# Patient Record
Sex: FEMALE | Race: WHITE | Employment: UNEMPLOYED | URBAN - METROPOLITAN AREA
[De-identification: names, ages, dates, MRNs, and addresses within clinical notes are randomized per-mention and may not be internally consistent; named-entity substitution may affect disease eponyms.]

---

## 2018-08-21 ENCOUNTER — OFFICE VISIT (OUTPATIENT)
Dept: FAMILY MEDICINE CLINIC | Age: 38
End: 2018-08-21

## 2018-08-21 VITALS
TEMPERATURE: 98.1 F | OXYGEN SATURATION: 100 % | HEIGHT: 68 IN | DIASTOLIC BLOOD PRESSURE: 60 MMHG | WEIGHT: 181 LBS | RESPIRATION RATE: 16 BRPM | BODY MASS INDEX: 27.43 KG/M2 | HEART RATE: 60 BPM | SYSTOLIC BLOOD PRESSURE: 93 MMHG

## 2018-08-21 DIAGNOSIS — Z98.84 HISTORY OF GASTRIC BYPASS: ICD-10-CM

## 2018-08-21 DIAGNOSIS — Z86.2 HISTORY OF IRON DEFICIENCY ANEMIA: ICD-10-CM

## 2018-08-21 DIAGNOSIS — G25.81 RLS (RESTLESS LEGS SYNDROME): ICD-10-CM

## 2018-08-21 DIAGNOSIS — G62.9 PERIPHERAL POLYNEUROPATHY: Primary | ICD-10-CM

## 2018-08-21 RX ORDER — ASPIRIN 325 MG
TABLET, DELAYED RELEASE (ENTERIC COATED) ORAL
COMMUNITY

## 2018-08-21 RX ORDER — GABAPENTIN 300 MG/1
CAPSULE ORAL
Qty: 90 CAP | Refills: 0 | Status: SHIPPED | OUTPATIENT
Start: 2018-08-21 | End: 2018-09-17 | Stop reason: SDUPTHER

## 2018-08-21 RX ORDER — GABAPENTIN 600 MG/1
TABLET ORAL
Refills: 1 | COMMUNITY
Start: 2018-07-30 | End: 2018-08-21 | Stop reason: DRUGHIGH

## 2018-08-21 RX ORDER — METHOCARBAMOL 500 MG/1
TABLET, FILM COATED ORAL
Refills: 1 | COMMUNITY
Start: 2018-08-01 | End: 2018-11-05 | Stop reason: SDUPTHER

## 2018-08-21 RX ORDER — CLOBETASOL PROPIONATE 0.5 MG/G
CREAM TOPICAL
Refills: 1 | COMMUNITY
Start: 2018-07-31

## 2018-08-21 RX ORDER — PRAMIPEXOLE DIHYDROCHLORIDE 0.12 MG/1
TABLET ORAL
Refills: 0 | COMMUNITY
Start: 2018-07-31 | End: 2018-08-21 | Stop reason: SDUPTHER

## 2018-08-21 RX ORDER — TRAMADOL HYDROCHLORIDE 50 MG/1
50 TABLET ORAL
COMMUNITY
End: 2018-11-05 | Stop reason: SDUPTHER

## 2018-08-21 RX ORDER — INDOMETHACIN 50 MG/1
CAPSULE ORAL
Refills: 0 | COMMUNITY
Start: 2018-07-30 | End: 2019-01-23 | Stop reason: SDUPTHER

## 2018-08-21 RX ORDER — PRAMIPEXOLE DIHYDROCHLORIDE 0.12 MG/1
TABLET ORAL
Qty: 90 TAB | Refills: 0 | Status: SHIPPED | OUTPATIENT
Start: 2018-08-21 | End: 2018-09-17 | Stop reason: SDUPTHER

## 2018-08-21 RX ORDER — FERROUS FUMARATE 324(106)MG
TABLET ORAL
COMMUNITY

## 2018-08-21 NOTE — MR AVS SNAPSHOT
[address] 
[phone] Patient: Norris Cameron MRN: [id number] Visit Information Date & Time Provider Department Dept. Phone Encounter #  
 2018 10:00 AM Ivan Marin, Aishwarya Gonzalez [phone] [id number] Upcoming Health Maintenance Date Due DTaP/Tdap/Td series (1 - Tdap) 1999 PAP AKA CERVICAL CYTOLOGY 1999 Influenza Age 5 to Adult 2018 Allergies as of 2018  Review Complete On: 2018 By: Ivan Marin MD  
  
 Severity Noted Reaction Type Reactions Pcn [Penicillins] High 2018    Anaphylaxis Codeine  2018    Nausea and Vomiting Patient states it causes intractable vomiting Doxycycline  2018    Rash Current Immunizations  Never Reviewed No immunizations on file. Not reviewed this visit You Were Diagnosed With   
  
 Codes Comments Peripheral polyneuropathy    -  Primary ICD-10-CM: G62.9 ICD-9-CM: 356.9 History of gastric bypass     ICD-10-CM: Z98.84 ICD-9-CM: V45.86   
 RLS (restless legs syndrome)     ICD-10-CM: G25.81 ICD-9-CM: 333.94 History of iron deficiency anemia     ICD-10-CM: Z86.2 ICD-9-CM: V12.3 Vitals BP Pulse Temp Resp Height(growth percentile) Weight(growth percentile) 93/60 (BP 1 Location: Right arm, BP Patient Position: Sitting) 60 98.1 °F (36.7 °C) (Oral) 16 5' 7.75\" (1.721 m) 181 lb (82.1 kg) LMP SpO2 BMI OB Status Smoking Status 2018 (Approximate) 100% 27.72 kg/m2 Having regular periods Never Smoker Vitals History BMI and BSA Data Body Mass Index Body Surface Area  
 27.72 kg/m 2 1.98 m 2 Preferred Pharmacy Pharmacy Name Phone [facility] [place], [address] [place] AT [facility] [phone] Your Updated Medication List  
  
   
 This list is accurate as of 8/21/18 11:01 AM.  Always use your most recent med list.  
  
  
  
  
 cholecalciferol 50,000 unit capsule Commonly known as:  VITAMIN D3 Take  by mouth every seven (7) days. clobetasol 0.05 % topical cream  
Commonly known as:  TEMOVATE  
ELICEO EXT AA QD PRN  
  
 ferrous fumarate 324 mg (106 mg iron) Tab Take  by mouth.  
  
 gabapentin 300 mg capsule Commonly known as:  NEURONTIN One tab po in the am and two tabs in the evening  Indications: NEUROPATHIC PAIN  
  
 indomethacin 50 mg capsule Commonly known as:  INDOCIN  
TK 1 C PO BID WF OR MILK  
  
 methocarbamol 500 mg tablet Commonly known as:  ROBAXIN  
TAKE 1 AND 1/2 TS PO ONCE A DAY  
  
 pramipexole 0.125 mg tablet Commonly known as:  MIRAPEX TK 1 T PO  BEFORE BEDTIME QD  
  
 traMADol 50 mg tablet Commonly known as:  ULTRAM  
Take 50 mg by mouth every six (6) hours as needed for Pain. Prescriptions Sent to Pharmacy Refills  
 pramipexole (MIRAPEX) 0.125 mg tablet 0 Sig: TK 1 T PO  BEFORE BEDTIME QD Class: Normal  
 Pharmacy: [address] Ph #: [phone]  
 gabapentin (NEURONTIN) 300 mg capsule 0 Sig: One tab po in the am and two tabs in the evening  Indications: NEUROPATHIC PAIN Class: Normal  
 Pharmacy: [address] Ph #: [phone] We Performed the Following NAZ IFA W/REFLEX  CASCADE [72697 CPT(R)] CBC WITH AUTOMATED DIFF [35654 CPT(R)] FERRITIN [70438 CPT(R)] HEMOGLOBIN A1C WITH EAG [26153 CPT(R)] IRON PROFILE [id number] CPT(R)] METABOLIC PANEL, COMPREHENSIVE [64582 CPT(R)] SED RATE (ESR) [id number] CPT(R)] THYROID PANEL W/TSH [48351 CPT(R)] VITAMIN B12 & FOLATE [79820 CPT(R)] VITAMIN D, 25 HYDROXY [id number] CPT(R)] Patient Instructions Learning About Vitamin D Why is it important to get enough vitamin D? Your body needs vitamin D to absorb calcium. Calcium keeps your bones and muscles, including your heart, healthy and strong. If your muscles don't get enough calcium, they can cramp, hurt, or feel weak. You may have long-term (chronic) muscle aches and pains. If you don't get enough vitamin D throughout life, you have an increased chance of having thin and brittle bones (osteoporosis) in your later years. Children who don't get enough vitamin D may not grow as much as others their age. They also have a chance of getting a rare disease called rickets. It causes weak bones. Vitamin D and calcium are added to many foods. And your body uses sunshine to make its own vitamin D. How much vitamin D do you need? The [place] of [employer] recommends that people ages 3 through 79 get 600 IU (international units) every day. Adults 71 and older need 800 IU every day. Blood tests for vitamin D can check your vitamin D level. But there is no standard normal range used by all laboratories. The [place] of [employer] recommends a blood level of 20 ng/mL of vitamin D for healthy bones. And most people in the [place] and [facility] ([facility]) meet this goal. 
How can you get more vitamin D? Foods that contain vitamin D include: 
· [place], tuna, and mackerel. These are some of the best foods to eat when you need to get more vitamin D. 
· Cheese, egg yolks, and beef liver. These foods have vitamin D in small amounts. · Milk, soy drinks, orange juice, yogurt, margarine, and some kinds of cereal have vitamin D added to them. Some people don't make vitamin D as well as others. They may have to take extra care in getting enough vitamin D. Things that reduce how much vitamin D your body makes include: · Dark skin, such as many  Americans have. · Age, especially if you are older than 72. · Digestive problems, such as Crohn's or celiac disease. · Liver and kidney disease. Some people who do not get enough vitamin D may need supplements. Are there any risks from taking vitamin D? 
· Too much vitamin D: 
¨ Can damage your kidneys. ¨ Can cause nausea and vomiting, constipation, and weakness. ¨ Raises the amount of calcium in your blood. If this happens, you can get confused or have an irregular heart rhythm. · Vitamin D may interact with other medicines. Tell your doctor about all of the medicines you take, including over-the-counter drugs, herbs, and pills. Tell your doctor about all of your current medical problems. Where can you learn more? Go to http://nirajInteractive Fitnessdara.info/. Enter [id number] in the search box to learn more about \"Learning About Vitamin D.\" 
Current as of: May 12, 2017 Content Version: 11.7 © 5968-6267 Asantae. Care instructions adapted under license by Playrific (which disclaims liability or warranty for this information). If you have questions about a medical condition or this instruction, always ask your healthcare professional. [place] [address] any warranty or liability for your use of this information. Iron Deficiency Anemia: Care Instructions Your Care Instructions Anemia means that you do not have enough red blood cells. Red blood cells carry oxygen around your body. When you have anemia, it can make you pale, weak, and tired. Many things can cause anemia. The most common cause is loss of blood. This can happen if you have heavy menstrual periods. It can also happen if you have bleeding in your stomach or bowel. You can also get anemia if you don't have enough iron in your diet or if it's hard for your body to absorb iron. In some cases, pregnancy causes anemia. That's because a pregnant woman needs more iron. Your doctor may do more tests to find the cause of your anemia.  If a disease or other health problem is causing it, your doctor will treat that problem. It's important to follow up with your doctor to make sure that your iron level returns to normal. 
Follow-up care is a key part of your treatment and safety. Be sure to make and go to all appointments, and call your doctor if you are having problems. It's also a good idea to know your test results and keep a list of the medicines you take. How can you care for yourself at home? · If your doctor recommended iron pills, take them as directed. ¨ Try to take the pills on an empty stomach. You can do this about 1 hour before or 2 hours after meals. But you may need to take iron with food to avoid an upset stomach. ¨ Do not take antacids or drink milk or anything with caffeine within 2 hours of when you take your iron. They can keep your body from absorbing the iron well. ¨ Vitamin C helps your body absorb iron. You may want to take iron pills with a glass of orange juice or some other food high in vitamin C. 
¨ Iron pills may cause stomach problems. These include heartburn, nausea, diarrhea, constipation, and cramps. It can help to drink plenty of fluids and include fruits, vegetables, and fiber in your diet. ¨ It's normal for iron pills to make your stool a greenish or grayish black. But internal bleeding can also cause dark stool. So it's important to tell your doctor about any color changes. ¨ Call your doctor if you think you are having a problem with your iron pills. Even after you start to feel better, it will take several months for your body to build up its supply of iron. ¨ If you miss a pill, don't take a double dose. ¨ Keep iron pills out of the reach of small children. Too much iron can be very dangerous. · Eat foods with a lot of iron. These include red meat, shellfish, poultry, and eggs. They also include beans, raisins, whole-grain bread, and leafy green vegetables. · Steam your vegetables. This is the best way to prepare them if you want to get as much iron as possible. · Be safe with medicines. Do not take nonsteroidal anti-inflammatory pain relievers unless your doctor tells you to. These include aspirin, naproxen (Aleve), and ibuprofen (Advil, Motrin). · Liquid iron can stain your teeth. But you can mix it with water or juice and drink it with a straw. Then it won't get on your teeth. When should you call for help? Call 911 anytime you think you may need emergency care. For example, call if: 
  · You passed out (lost consciousness).  
 Call your doctor now or seek immediate medical care if: 
  · You are short of breath.  
  · You are dizzy or light-headed, or you feel like you may faint.  
  · You have new or worse bleeding.  
 Watch closely for changes in your health, and be sure to contact your doctor if: 
  · You feel weaker or more tired than usual.  
  · You do not get better as expected. Where can you learn more? Go to http://niraj-dara.info/. Enter Q040 in the search box to learn more about \"Iron Deficiency Anemia: Care Instructions. \" Current as of: October 9, 2017 Content Version: 11.7 © 5135-3220 Tapit. Care instructions adapted under license by ELERTS (which disclaims liability or warranty for this information). If you have questions about a medical condition or this instruction, always ask your healthcare professional. [address] any warranty or liability for your use of this information. Iron-Rich Diet: Care Instructions Your Care Instructions Your body needs iron to make hemoglobin. Hemoglobin is a substance in red blood cells that carries oxygen from the lungs to cells all through your body. If you do not get enough iron, your body makes fewer and smaller red blood cells. As a result, your body's cells may not get enough oxygen.  
Adult men need 8 milligrams of iron a day; adult women need 18 milligrams of iron a day. After menopause, women need 8 milligrams of iron a day. A pregnant woman needs 27 milligrams of iron a day. Infants and young children have higher iron needs relative to their size than other age groups. People who have lost blood because of ulcers or heavy menstrual periods may become very low in iron and may develop anemia. Most people can get the iron their bodies need by eating enough of certain iron-rich foods. Your doctor may recommend that you take an iron supplement along with eating an iron-rich diet. Follow-up care is a key part of your treatment and safety. Be sure to make and go to all appointments, and call your doctor if you are having problems. It's also a good idea to know your test results and keep a list of the medicines you take. How can you care for yourself at home? · Make iron-rich foods a part of your daily diet. Iron-rich foods include: ¨ All meats, such as chicken, beef, lamb, pork, fish, and shellfish. Liver is especially high in iron. ¨ Leafy green vegetables. ¨ Raisins, peas, beans, lentils, barley, and eggs. ¨ Iron-fortified breakfast cereals. · Eat foods with vitamin C along with iron-rich foods. Vitamin C helps you absorb more iron from food. Drink a glass of orange juice or another citrus juice with your food. · Eat meat and vegetables or grains together. The iron in meat helps your body absorb the iron in other foods. Where can you learn more? Go to http://niraj-dara.info/. Enter [id number] in the search box to learn more about \"Iron-Rich Diet: Care Instructions. \" Current as of: May 12, 2017 Content Version: 11.7 © 1402-5172 Vivaty. Care instructions adapted under license by "Frost[employer] Video[employer]." (which disclaims liability or warranty for this information).  If you have questions about a medical condition or this instruction, always ask your healthcare professional. [employer] disclaims any warranty or liability for your use of this information. Restless Legs Syndrome: Care Instructions Your Care Instructions Restless legs syndrome is a common nervous system problem. People with this syndrome feel a creeping, achy, or unpleasant feeling in the legs and an overpowering urge to move them. It often occurs in the evening and at night and can lead to sleep problems and tiredness. Your doctor may suggest doing a study of your sleep patterns to figure out what is happening when you try to sleep. Many people get relief from symptoms when they get regular exercise, eat well, and avoid caffeine, alcohol, and tobacco. 
Follow-up care is a key part of your treatment and safety. Be sure to make and go to all appointments, and call your doctor if you are having problems. It's also a good idea to know your test results and keep a list of the medicines you take. How can you care for yourself at home? · Take your medicines exactly as prescribed. Call your doctor if you think you are having a problem with your medicine. · Try bathing in hot or cold water. Applying a heating pad or ice bag to your legs may also help symptoms. · Stretch and massage your legs before bed or when discomfort begins. · Get some exercise for at least 30 minutes a day on most days of the week. Stop exercising at least 3 hours before bedtime. · Try to plan for situations where you will need to remain seated for long stretches. For example, if you are traveling by car, plan some stops so you can get out and walk around. · Tell your doctor about any medicines you are taking. This includes all over-the-counter, prescription, and herbal medicines. Some medicines, such as antidepressants, antihistamines, and cold and sinus medicines, can make your symptoms worse. · Avoid caffeine products, such as coffee, tea, cola, and chocolate. Caffeine can interrupt your sleep and stimulate you. · Do not smoke. Nicotine can make restless legs worse. If you need help quitting, talk to your doctor about stop-smoking programs and medicines. These can increase your chances of quitting for good. · Do not drink alcohol late in the evening. Take steps to help you sleep better · Get plenty of sunlight in the outdoors, particularly later in the afternoon. · Use the evening hours for settling down. Avoid activities that challenge you in the hours before bedtime. · Eat meals at regular times, and do not snack before bedtime. · Keep your bedroom quiet, dark, and cool. Try using a sleep mask and earplugs to help you sleep. · Limit how much you drink at night to reduce your need to get up to urinate. But do not go to bed thirsty. · Run a fan or other steady \"white noise\" during the night if noises wake you up. · [place] the bed for sleeping and sex. Do your reading or TV watching in another room. · Once you are in bed, relax from head to toe, and guide your mind to pleasant thoughts. · Do not stay in bed longer than 8 hours, and try to avoid naps. · If your symptoms usually improve around 4 a.m. to 6 a.m., try going to bed later than usual or allowing extra time for sleeping in to help you get the rest you need. When should you call for help? Watch closely for changes in your health, and be sure to contact your doctor if: 
  · You are still not getting enough sleep.  
  · Your symptoms become more severe or happen more often. Where can you learn more? Go to http://niraj-dara.info/. Enter E580 in the search box to learn more about \"Restless Legs Syndrome: Care Instructions. \" Current as of: October 9, 2017 Content Version: 11.7 © 7983-7506 KXEN. Care instructions adapted under license by Signal Vine (which disclaims liability or warranty for this information).  If you have questions about a medical condition or this instruction, always ask your healthcare professional. [place] [address] any warranty or liability for your use of this information. Neuropathic Pain: Care Instructions Your Care Instructions Neuropathic pain is caused by pressure on or damage to your nerves. It's often simply called nerve pain. Some people feel this type of pain all the time. For others, it comes and goes. Diabetes, shingles, or an injury can cause nerve pain. Many people say the pain feels sharp, burning, or stabbing. But some people feel it as a dull ache. In some cases, it makes your skin very sensitive. So touch, pressure, and other sensations that did not hurt before may now cause pain. It's important to know that this kind of pain is real and can affect your quality of life. It's also important to know that treatment can help. Treatment includes pain medicines, exercise, and physical therapy. Medicines can help reduce the number of pain signals that travel over the nerves. This can make the painful areas less sensitive. It can also help you sleep better and improve your mood. But medicines are only one part of successful treatment. Most people do best with more than one kind of treatment. Your doctor may recommend that you try cognitive-behavioral therapy and stress management. Or, if needed, you may decide to try to quit smoking, lower your blood pressure, or better control blood sugar. These kinds of healthy changes can also make a difference. If you feel that your treatment is not working, talk to your doctor. And be sure to tell your doctor if you think you might be depressed or anxious. These are common problems that can also be treated. Follow-up care is a key part of your treatment and safety. Be sure to make and go to all appointments, and call your doctor if you are having problems. It's also a good idea to know your test results and keep a list of the medicines you take. How can you care for yourself at home? · Be safe with medicines. Read and follow all instructions on the label. ¨ If the doctor gave you a prescription medicine for pain, take it as prescribed. ¨ If you are not taking a prescription pain medicine, ask your doctor if you can take an over-the-counter medicine. · Save hard tasks for days when you have less pain. Follow a hard task with an easy task. And remember to take breaks. · Relax, and reduce stress. You may want to try deep breathing or meditation. These can help. · Keep moving. Gentle, daily exercise can help reduce pain. Your doctor or physical therapist can tell you what type of exercise is best for you. This may include walking, swimming, and stationary biking. It may also include stretches and range-of-motion exercises. · Try heat, cold packs, and massage. · Get enough sleep. Constant pain can make you more tired. If the pain makes it hard to sleep, talk with your doctor. · Think positively. Your thoughts can affect your pain. Do fun things to distract yourself from the pain. See a movie, read a book, listen to music, or spend time with a friend. · Keep a pain diary. Try to write down how strong your pain is and what it feels like. Also try to notice and write down how your moods, thoughts, sleep, activities, and medicine affect your pain. These notes can help you and your doctor find the best ways to treat your pain. Reducing constipation caused by pain medicine Pain medicines often cause constipation. To reduce constipation: 
· Include fruits, vegetables, beans, and whole grains in your diet each day. These foods are high in fiber. · Drink plenty of fluids, enough so that your urine is light yellow or clear like water. If you have kidney, heart, or liver disease and have to limit fluids, talk with your doctor before you increase the amount of fluids you drink. · Get some exercise every day.  Build up slowly to 30 to 60 minutes a day on 5 or more days of the week. · Take a fiber supplement, such as Citrucel or Metamucil, every day if needed. Read and follow all instructions on the label. · Schedule time each day for a bowel movement. Having a daily routine may help. Take your time and do not strain when having a bowel movement. · Ask your doctor about a laxative. The goal is to have one easy bowel movement every 1 to 2 days. Do not let constipation go untreated for more than 3 days. When should you call for help? Call your doctor now or seek immediate medical care if: 
  · You feel sad, anxious, or hopeless for more than a few days. This could mean you are depressed. Depression is common in people who have a lot of pain. But it can be treated.  
  · You have trouble with bowel movements, such as: 
¨ No bowel movement in 3 days. ¨ Blood in the anal area, in your stool, or on the toilet paper. ¨ Diarrhea for more than 24 hours.  
 Watch closely for changes in your health, and be sure to contact your doctor if: 
  · Your pain is getting worse.  
  · You can't sleep because of pain.  
  · You are very worried or anxious about your pain.  
  · You have trouble taking your pain medicine.  
  · You have any concerns about your pain medicine or its side effects.  
  · You have vomiting or cramps for more than 2 hours. Where can you learn more? Go to http://niraj-dara.info/. Enter Z129 in the search box to learn more about \"Neuropathic Pain: Care Instructions. \" Current as of: October 9, 2017 Content Version: 11.7 © 2275-2172 [employer] Technologies. Care instructions adapted under license by Xi3 (which disclaims liability or warranty for this information). If you have questions about a medical condition or this instruction, always ask your healthcare professional. [place] [address] any warranty or liability for your use of this information. Learning About Peripheral Neuropathy What is peripheral neuropathy? Peripheral neuropathy is a problem that affects the peripheral nerves. These nerves lead from the spinal cord to other parts of the body. They control your sense of touch, how you feel pain and temperature, and your muscle strength. Most of the time the problem starts in the fingers and toes. As it gets worse, it moves into the limbs. It can cause pain and loss of feeling in the feet, legs, and hands. What causes it? There are several causes of peripheral neuropathy: 
· Diabetes. This is the most common cause. If your blood sugar is too high for too long, it can damage the nerves. · Kidney problems. These can lead to toxic substances in the blood that damage nerves. · Overusing alcohol and not eating a healthy diet. These can lead to your body not having enough of certain vitamins, such as vitamin B-12. This can damage nerves. · Infectious or inflammatory diseases. These include HIV and Guillain-Barré syndrome. These diseases can damage the nerves. · Being exposed to toxic substances. These include certain medicines, such as those used for chemotherapy. · Low levels of thyroid hormone (hypothyroidism). Sometimes the cause is not known. What are the symptoms? Symptoms of peripheral neuropathy can occur slowly over time. The most common ones are: · Numbness, tightness, and tingling, especially in the legs, hands, and feet. · Loss of feeling. · Burning, shooting, or stabbing pain in the legs, hands, and feet. Often the pain is worse at night. · Weakness and loss of balance. What can happen if you have it? If peripheral neuropathy gets worse, it can lead to a complete lack of feeling in your hands or feet. This can make you more likely to injure them. It may lead to calluses and blisters. It can also lead to bone and joint problems, infection, and ulcers.  
For instance, small, repeated injuries to the foot may lead to bigger problems. This can happen because you can't feel the injuries. Reduced feeling in the feet can also change your step, leading to bone or joint problems. If untreated, foot problems can become so severe that the foot or lower leg may have to be amputated. But treatment can slow down peripheral neuropathy. And it's a good idea to take care to avoid injury. How is it diagnosed? To diagnose peripheral neuropathy, your doctor will ask you about: 
· Your symptoms. · Your medical history. This may include your use of alcohol, risk of HIV infection, or exposure to toxic substances. · Your family's medical history, including nerve disease. Your doctor may test how well you can feel touch, temperature, and pain. You may also have blood tests. These tests will help the doctor find out if you have conditions that can cause neuropathy. Examples are diabetes, vitamin deficiencies, thyroid disease, and kidney problems. How is it treated? Treatment for peripheral neuropathy can relieve symptoms. This is done by treating the health problem that's causing it. For example, if you have diabetes, keeping your blood sugar within your target range may help. Or maybe your body lacks certain vitamins caused by drinking too much alcohol. In that case, treatment may include eating a healthy diet, taking vitamins, and stopping alcohol use. You may have physical therapy. This can increase muscle strength and help build muscle control. Over-the-counter medicine can relieve mild nerve pain. Your doctor may also prescribe medicine to help with severe pain, numbness, tingling, and weakness. If you have neuropathy in your feet, it's a good idea to have them checked during each office visit. This can help prevent problems. Some people find that physical therapy, acupuncture, or transcutaneous electrical nerve stimulation (TENS) helps relieve pain. Follow-up care is a key part of your treatment and safety.  Be sure to make and go to all appointments, and call your doctor if you are having problems. It's also a good idea to know your test results and keep a list of the medicines you take. Where can you learn more? Go to http://niraj-dara.info/. Enter P130 in the search box to learn more about \"Learning About Peripheral Neuropathy. \" Current as of: November 28, 2017 Content Version: 11.7 © 1712-9532 [employer]. Care instructions adapted under license by [employer] (which disclaims liability or warranty for this information). If you have questions about a medical condition or this instruction, always ask your healthcare professional. [address] any warranty or liability for your use of this information. Introducing hospitals & HEALTH SERVICES! Benton Morton introduces [employer] patient portal. Now you can access parts of your medical record, email your doctor's office, and request medication refills online. 1. In your internet browser, go to [email or web address] 2. Click on the First Time User? Click Here link in the Sign In box. You will see the New Member Sign Up page. 3. Enter your [employer] Access Code exactly as it appears below. You will not need to use this code after youve completed the sign-up process. If you do not sign up before the expiration date, you must request a new code. · [employer] Access Code: [email or web address] Expires: 11/19/2018 11:01 AM 
 
4. Enter the last four digits of your Social Security Number (xxxx) and Date of Birth (mm/dd/yyyy) as indicated and click Submit. You will be taken to the next sign-up page. 5. Create a Tinker Games ID. This will be your [employer] Games login ID and cannot be changed, so think of one that is secure and easy to remember. 6. Create a [employer] password. You can change your password at any time. 7. Enter your Password Reset Question and Answer.  This can be used at a later time if you forget your password. 8. Enter your e-mail address. You will receive e-mail notification when new information is available in [address] 9. Click Sign Up. You can now view and download portions of your medical record. 10. Click the Download Summary menu link to download a portable copy of your medical information. If you have questions, please visit the Frequently Asked Questions section of the SweetSpot WiFi website. Remember, [employer] is NOT to be used for urgent needs. For medical emergencies, dial 911. Now available from your iPhone and Android! Please provide this summary of care documentation to your next provider. If you have any questions after today's visit, please call [phone].

## 2018-08-21 NOTE — PROGRESS NOTES
Identified Patient with two Patient identifiers (Name and ). Two Patient Identifiers confirmed. Reviewed record in preparation for visit and have obtained necessary documentation. Chief Complaint   Patient presents with   [address]     would like to talk about anemia and increase her dose of gabapentin       Visit Vitals    BP 93/60 (BP 1 Location: Right arm, BP Patient Position: Sitting)    Pulse 60    Temp 98.1 °F (36.7 °C) (Oral)    Resp 16    Ht 5' 7.75\" (1.721 m)    Wt 181 lb (82.1 kg)    SpO2 100%    BMI 27.72 kg/m2       1. Have you been to the ER, urgent care clinic since your last visit? Hospitalized since your last visit? NEW PATIENT    2. Have you seen or consulted any other health care providers outside of the [address] [place] [address] since your last visit? Include any pap smears or colon screening.  NEW PATIENT

## 2018-08-21 NOTE — PROGRESS NOTES
Elaine Loja  34 y.o. female  1980  [facility], [place] [id number]  <[id number]>   [address]: Progress Note  Flakito Fulton MD       Encounter Date: 8/21/2018    Chief Complaint   Patient presents with   [address]     would like to talk about anemia and increase her dose of gabapentin     History of Present Illness   Elaine Loja is a 34 y.o. female who presents to clinic today for establishment of care. 1. Peripheral neuropathy. She states she has been to countless neurologists and rheumatologists in [place], [place], [place]. Most recently in [place]. Wants to increase the gabapentin from 600mg. She had been on as high as 900 mg. Takes only at night due to not wanting to.   2. Hx of RLS on mirapex. 3. Hx of iron deficiency anemia. Still has menses however, they are not heavy per her report.   -hx of gastric bypass with merline n y.   occ compliant with MTVs.     Recently relocated with a job for her . Review of Systems   Review of Systems   Constitutional: Negative for chills and fever. HENT: Negative for congestion and ear pain. Eyes: Positive for blurred vision. Respiratory: Negative. Negative for cough. Cardiovascular: Negative. Negative for chest pain. Gastrointestinal: Negative for abdominal pain, constipation, diarrhea, nausea and vomiting. Genitourinary: Negative. Musculoskeletal: Positive for myalgias. Neurological: Positive for tingling. Negative for focal weakness. Vitals/Objective:     Vitals:    08/21/18 1002   BP: 93/60   Pulse: 60   Resp: 16   Temp: 98.1 °F (36.7 °C)   TempSrc: Oral   SpO2: 100%   Weight: 181 lb (82.1 kg)   Height: 5' 7.75\" (1.721 m)     Body mass index is 27.72 kg/(m^2). Physical Exam   Constitutional: She appears well-developed and well-nourished. No distress. HENT:   Head: Normocephalic and atraumatic.    Right Ear: Tympanic membrane normal.   Left Ear: Tympanic membrane normal.   Mouth/Throat: Oropharynx is clear and moist.   Eyes: Pupils are equal, round, and reactive to light. Cardiovascular: Normal rate, regular rhythm, normal heart sounds and intact distal pulses. Exam reveals no gallop and no friction rub. No murmur heard. Pulmonary/Chest: Effort normal and breath sounds normal. No respiratory distress. She has no wheezes. She has no rales. Abdominal: Soft. Bowel sounds are normal. She exhibits no distension. There is no tenderness. There is no rebound and no guarding. Musculoskeletal: Normal range of motion. She exhibits no edema or tenderness. Neurological: She has normal reflexes. She displays normal reflexes. No cranial nerve deficit. Skin: Skin is warm. She is not diaphoretic. No results found for this or any previous visit (from the past 24 hour(s)). Assessment and Plan:   1. Peripheral polyneuropathy  Labs ordered for chr disease surveillance. Awaiting records from [facility]. Will increase gabapentin. Advised pt to take TID if possible. Advised not to take 900 mg at one time. - CBC WITH AUTOMATED DIFF  - METABOLIC PANEL, COMPREHENSIVE  - VITAMIN D, 25 HYDROXY  - HEMOGLOBIN A1C WITH EAG  - VITAMIN B12 & FOLATE  - THYROID PANEL W/TSH  - NAZ IFA W/REFLEX  CASCADE  - SED RATE (ESR)  - gabapentin (NEURONTIN) 300 mg capsule; One tab po in the am and two tabs in the evening  Indications: NEUROPATHIC PAIN  Dispense: 90 Cap; Refill: 0    2. History of gastric bypass  Check for vitamin deficiencies and malabsorption along with screening for RLS  - CBC WITH AUTOMATED DIFF  - IRON PROFILE  - VITAMIN D, 25 HYDROXY    3. RLS (restless legs syndrome)  RF given. - IRON PROFILE  - FERRITIN  - VITAMIN D, 25 HYDROXY  - VITAMIN B12 & FOLATE  - pramipexole (MIRAPEX) 0.125 mg tablet; TK 1 T PO  BEFORE BEDTIME QD  Dispense: 90 Tab; Refill: 0    4.  History of iron deficiency anemia  - CBC WITH AUTOMATED DIFF  - IRON PROFILE  - FERRITIN    I have discussed the diagnosis with the patient and the intended plan as seen in the above orders. she has expressed understanding. The patient has received an after-visit summary and questions were answered concerning future plans. I have discussed medication side effects and warnings with the patient as well. Follow-up Disposition: Not on File    Electronically Signed: Eliot Ortez MD     History   Patients past medical, surgical and family histories were reviewed and updated. Past Medical History:   Diagnosis Date    Anemia     Cervical incompetence     s/p cerclage with last pregnancy    Chiari malformation type I ([facility] [address] 2012    Fibromyalgia     History of  delivery     due to cervical incompetency-36 wks, 32 wks, and 37 wks deliveries    Lupus 2003    Lupus     Osteoarthritis     Other torticollis     Peripheral neuropathy     Raynaud phenomenon      Past Surgical History:   Procedure Laterality Date    HX GASTRIC BYPASS  2007    HX TONSILLECTOMY  2004     Family History   Problem Relation Age of Onset    Cancer Mother     Coronary Artery Disease Father     Parkinson's Disease Maternal Grandmother      Social History     Social History    Marital status: UNKNOWN     Spouse name: N/A    Number of children: N/A    Years of education: N/A     Occupational History    Not on file.      Social History Main Topics    Smoking status: Never Smoker    Smokeless tobacco: Never Used    Alcohol use No    Drug use: No    Sexual activity: Yes     Partners: Male     Birth control/ protection: None     Other Topics Concern    Not on file     Social History Narrative    No narrative on file            Current Medications/Allergies     Current Outpatient Prescriptions   Medication Sig Dispense Refill    indomethacin (INDOCIN) 50 mg capsule TK 1 C PO BID WF OR MILK  0    methocarbamol (ROBAXIN) 500 mg tablet TAKE 1 AND 1/2 TS PO ONCE A DAY  1    clobetasol (TEMOVATE) 0.05 % topical cream ELICEO EXT AA QD PRN  1    traMADol (ULTRAM) 50 mg tablet Take 50 mg by mouth every six (6) hours as needed for Pain.  cholecalciferol (VITAMIN D3) 50,000 unit capsule Take  by mouth every seven (7) days.  ferrous fumarate 324 mg (106 mg iron) tab Take  by mouth.       pramipexole (MIRAPEX) 0.125 mg tablet TK 1 T PO  BEFORE BEDTIME QD 90 Tab 0    gabapentin (NEURONTIN) 300 mg capsule One tab po in the am and two tabs in the evening  Indications: NEUROPATHIC PAIN 90 Cap 0     Allergies   Allergen Reactions    Pcn [Penicillins] Anaphylaxis    Codeine Nausea and Vomiting     Patient states it causes intractable vomiting     Doxycycline Rash

## 2018-08-23 LAB
25(OH)D3+25(OH)D2 SERPL-MCNC: 32.7 NG/ML (ref 30–100)
ALBUMIN SERPL-MCNC: 4.1 G/DL (ref 3.5–5.5)
ALBUMIN/GLOB SERPL: 1.6 {RATIO} (ref 1.2–2.2)
ALP SERPL-CCNC: 67 IU/L (ref 39–117)
ALT SERPL-CCNC: 12 IU/L (ref 0–32)
AST SERPL-CCNC: 15 IU/L (ref 0–40)
BASOPHILS # BLD AUTO: 0 X10E3/UL (ref 0–0.2)
BASOPHILS NFR BLD AUTO: 1 %
BILIRUB SERPL-MCNC: 0.6 MG/DL (ref 0–1.2)
BUN SERPL-MCNC: 11 MG/DL (ref 6–24)
BUN/CREAT SERPL: 18 (ref 9–23)
CALCIUM SERPL-MCNC: 8.6 MG/DL (ref 8.7–10.2)
CHLORIDE SERPL-SCNC: 108 MMOL/L (ref 96–106)
CO2 SERPL-SCNC: 22 MMOL/L (ref 20–29)
CREAT SERPL-MCNC: 0.62 MG/DL (ref 0.57–1)
EOSINOPHIL # BLD AUTO: 0.2 X10E3/UL (ref 0–0.4)
EOSINOPHIL NFR BLD AUTO: 4 %
ERYTHROCYTE [DISTWIDTH] IN BLOOD BY AUTOMATED COUNT: 17 % (ref 12.3–15.4)
ERYTHROCYTE [SEDIMENTATION RATE] IN BLOOD BY WESTERGREN METHOD: 3 MM/HR (ref 0–32)
EST. AVERAGE GLUCOSE BLD GHB EST-MCNC: 111 MG/DL
FERRITIN SERPL-MCNC: 5 NG/ML (ref 15–150)
FOLATE SERPL-MCNC: 7.4 NG/ML
FT4I SERPL CALC-MCNC: 1.4 (ref 1.2–4.9)
GLOBULIN SER CALC-MCNC: 2.5 G/DL (ref 1.5–4.5)
GLUCOSE SERPL-MCNC: 87 MG/DL (ref 65–99)
HBA1C MFR BLD: 5.5 % (ref 4.8–5.6)
HCT VFR BLD AUTO: 29.2 % (ref 34–46.6)
HGB BLD-MCNC: 8.8 G/DL (ref 11.1–15.9)
IMM GRANULOCYTES # BLD: 0 X10E3/UL (ref 0–0.1)
IMM GRANULOCYTES NFR BLD: 0 %
IRON SATN MFR SERPL: 3 % (ref 15–55)
IRON SERPL-MCNC: 15 UG/DL (ref 27–159)
LYMPHOCYTES # BLD AUTO: 1.6 X10E3/UL (ref 0.7–3.1)
LYMPHOCYTES NFR BLD AUTO: 39 %
MCH RBC QN AUTO: 23.2 PG (ref 26.6–33)
MCHC RBC AUTO-ENTMCNC: 30.1 G/DL (ref 31.5–35.7)
MCV RBC AUTO: 77 FL (ref 79–97)
MONOCYTES # BLD AUTO: 0.3 X10E3/UL (ref 0.1–0.9)
MONOCYTES NFR BLD AUTO: 8 %
NEUTROPHILS # BLD AUTO: 2 X10E3/UL (ref 1.4–7)
NEUTROPHILS NFR BLD AUTO: 48 %
PLATELET # BLD AUTO: 277 X10E3/UL (ref 150–379)
POTASSIUM SERPL-SCNC: 4.6 MMOL/L (ref 3.5–5.2)
PROT SERPL-MCNC: 6.6 G/DL (ref 6–8.5)
RBC # BLD AUTO: 3.8 X10E6/UL (ref 3.77–5.28)
SODIUM SERPL-SCNC: 143 MMOL/L (ref 134–144)
T3RU NFR SERPL: 24 % (ref 24–39)
T4 SERPL-MCNC: 6 UG/DL (ref 4.5–12)
TIBC SERPL-MCNC: 432 UG/DL (ref 250–450)
TSH SERPL DL<=0.005 MIU/L-ACNC: 1.89 UIU/ML (ref 0.45–4.5)
UIBC SERPL-MCNC: 417 UG/DL (ref 131–425)
VIT B12 SERPL-MCNC: 668 PG/ML (ref 232–1245)
WBC # BLD AUTO: 4.1 X10E3/UL (ref 3.4–10.8)

## 2018-08-23 NOTE — PROGRESS NOTES
Letter: all of your labs are normal except your iron levels. They are extremely low. Please take iron 325 mg two times daily to improve these levels. This is likely contributing to your restless leg syndrome. Continue all of your current medication and keep your upcoming appointment.

## 2018-09-17 ENCOUNTER — OFFICE VISIT (OUTPATIENT)
Dept: FAMILY MEDICINE CLINIC | Age: 38
End: 2018-09-17

## 2018-09-17 ENCOUNTER — HOSPITAL ENCOUNTER (OUTPATIENT)
Dept: LAB | Age: 38
Discharge: HOME | End: 2018-09-17
Payer: COMMERCIAL

## 2018-09-17 VITALS
DIASTOLIC BLOOD PRESSURE: 60 MMHG | TEMPERATURE: 98.3 F | HEIGHT: 68 IN | WEIGHT: 175 LBS | BODY MASS INDEX: 26.52 KG/M2 | OXYGEN SATURATION: 100 % | SYSTOLIC BLOOD PRESSURE: 97 MMHG | RESPIRATION RATE: 16 BRPM | HEART RATE: 60 BPM

## 2018-09-17 DIAGNOSIS — Z83.71 FAMILY HISTORY OF COLONIC POLYPS: ICD-10-CM

## 2018-09-17 DIAGNOSIS — G62.9 PERIPHERAL POLYNEUROPATHY: ICD-10-CM

## 2018-09-17 DIAGNOSIS — Z01.419 WELL WOMAN EXAM WITH ROUTINE GYNECOLOGICAL EXAM: Primary | ICD-10-CM

## 2018-09-17 DIAGNOSIS — Z12.39 BREAST CANCER SCREENING: ICD-10-CM

## 2018-09-17 DIAGNOSIS — G25.81 RLS (RESTLESS LEGS SYNDROME): ICD-10-CM

## 2018-09-17 DIAGNOSIS — D50.9 MICROCYTIC ANEMIA: ICD-10-CM

## 2018-09-17 DIAGNOSIS — G43.709 CHRONIC MIGRAINE WITHOUT AURA WITHOUT STATUS MIGRAINOSUS, NOT INTRACTABLE: ICD-10-CM

## 2018-09-17 PROCEDURE — 88175 CYTOPATH C/V AUTO FLUID REDO: CPT | Performed by: FAMILY MEDICINE

## 2018-09-17 PROCEDURE — 87624 HPV HI-RISK TYP POOLED RSLT: CPT | Performed by: FAMILY MEDICINE

## 2018-09-17 RX ORDER — PRAMIPEXOLE DIHYDROCHLORIDE 0.12 MG/1
TABLET ORAL
Qty: 90 TAB | Refills: 0 | Status: SHIPPED | OUTPATIENT
Start: 2018-09-17 | End: 2019-01-03 | Stop reason: SDUPTHER

## 2018-09-17 NOTE — PROGRESS NOTES
Chief Complaint Patient presents with  Complete Physical  
 
1. Have you been to the ER, urgent care clinic since your last visit? Hospitalized since your last visit? No 
 
2. Have you seen or consulted any other health care providers outside of the [facility] since your last visit? Include any pap smears or colon screening.  No

## 2018-09-17 NOTE — MR AVS SNAPSHOT
[address] 
[phone] Patient: Hernán Russell MRN: [id number] EHL:[id number] Visit Information Date & Time Provider Department Dept. Phone Encounter #  
 9/17/2018  3:00 PM Gisel Bartlett, [address] [facility] [phone] [id number] Follow-up Instructions Return in about 3 months (around 12/17/2018) for Anemia follow up. Upcoming Health Maintenance Date Due DTaP/Tdap/Td series (1 - Tdap) 1/20/1999 PAP AKA CERVICAL CYTOLOGY 1/20/1999 Influenza Age 5 to Adult 8/1/2018 Allergies as of 9/17/2018  Review Complete On: 9/17/2018 By: Judith Patch Severity Noted Reaction Type Reactions Pcn [Penicillins] High 08/21/2018    Anaphylaxis Codeine  08/21/2018    Nausea and Vomiting Patient states it causes intractable vomiting Doxycycline  08/21/2018    Rash Current Immunizations  Never Reviewed No immunizations on file. Not reviewed this visit You Were Diagnosed With   
  
 Codes Comments Well woman exam with routine gynecological exam    -  Primary ICD-10-CM: X42.808 ICD-9-CM: V72.31   
 RLS (restless legs syndrome)     ICD-10-CM: G25.81 ICD-9-CM: 333.94 Breast cancer screening     ICD-10-CM: Z12.31 
ICD-9-CM: V76.10 Microcytic anemia     ICD-10-CM: D50.9 ICD-9-CM: 280.9 Family history of colonic polyps     ICD-10-CM: Z83.71 ICD-9-CM: V18.51 Chronic migraine without aura without status migrainosus, not intractable     ICD-10-CM: U65.904 ICD-9-CM: 346.70 Vitals BP Pulse Temp Resp Height(growth percentile) Weight(growth percentile) 97/60 (BP 1 Location: Right arm, BP Patient Position: Sitting) 100 98.3 °F (36.8 °C) (Oral) 16 5' 7.75\" (1.721 m) 175 lb (79.4 kg) LMP SpO2 BMI OB Status Smoking Status 09/12/2018 (Approximate) (!) 60% 26.81 kg/m2 Having regular periods Never Smoker Vitals History BMI and BSA Data Body Mass Index Body Surface Area  
 26.81 kg/m 2 1.95 m 2 Preferred Pharmacy Pharmacy Name Phone [facility] [address] AT [facility] [phone] Your Updated Medication List  
  
   
This list is accurate as of 9/17/18  4:29 PM.  Always use your most recent med list.  
  
  
  
  
 cholecalciferol 50,000 unit capsule Commonly known as:  VITAMIN D3 Take  by mouth every seven (7) days. clobetasol 0.05 % topical cream  
Commonly known as:  TEMOVATE  
ELICEO EXT AA QD PRN  
  
 ferrous fumarate 324 mg (106 mg iron) Tab Take  by mouth.  
  
 gabapentin 300 mg capsule Commonly known as:  NEURONTIN One tab po in the am and two tabs in the evening  Indications: NEUROPATHIC PAIN  
  
 indomethacin 50 mg capsule Commonly known as:  INDOCIN  
TK 1 C PO BID WF OR MILK  
  
 methocarbamol 500 mg tablet Commonly known as:  ROBAXIN  
TAKE 1 AND 1/2 TS PO ONCE A DAY  
  
 pramipexole 0.125 mg tablet Commonly known as:  MIRAPEX TK 1 T PO  BEFORE BEDTIME QD  
  
 traMADol 50 mg tablet Commonly known as:  ULTRAM  
Take 50 mg by mouth every six (6) hours as needed for Pain. Prescriptions Sent to Pharmacy Refills  
 pramipexole (MIRAPEX) 0.125 mg tablet 0 Sig: TK 1 T PO  BEFORE BEDTIME QD Class: Normal  
 Pharmacy: [facility] [address] Ph #: [phone] We Performed the Following PAP IG, APTIMA HPV AND RFX 16/18,45 ([id number]) [[id number] Custom] REFERRAL TO GASTROENTEROLOGY [QCP93 Custom] REFERRAL TO HEMATOLOGY [AGP91 Custom] REFERRAL TO HEMATOLOGY [EBF39 Custom] Comments:  
 Microcytic anemia. Hx of gastric bypass. REFERRAL TO NEUROLOGY [QBD85 Custom] Comments:  
 Migraines and chiari malformation Follow-up Instructions Return in about 3 months (around 12/17/2018) for Anemia follow up. To-Do List   
 09/17/2018 Imaging:  ANT MAMMO BI SCREENING INCL CAD Referral Information Referral ID Referred By Referred To  
  
 [id number] Conner HOWE Not Available Visits Status Start Date End Date 1 New Request 9/17/18 9/17/19 If your referral has a status of pending review or denied, additional information will be sent to support the outcome of this decision. Referral ID Referred By Referred To  
 [id number] [facility] [address] [address] Phone: [phone] Fax: [phone] Visits Status Start Date End Date 1 New Request 9/17/18 9/17/19 If your referral has a status of pending review or denied, additional information will be sent to support the outcome of this decision. Referral ID Referred By Referred To  
 [id number] Eber PRIETO MD  
   [address] [facility] [address] [place], [address] [facility] Phone: [phone] Fax: [phone] Visits Status Start Date End Date 1 New Request 9/17/18 9/17/19 If your referral has a status of pending review or denied, additional information will be sent to support the outcome of this decision. Referral ID Referred By Referred To  
 [id number] Israel PRIETO MD  
   [address] [facility] [address] [place] [id number] [facility] Phone: [phone] Fax: [phone] Visits Status Start Date End Date 1 New Request 9/17/18 9/17/19 If your referral has a status of pending review or denied, additional information will be sent to support the outcome of this decision. Patient Instructions Colon Cancer Screening: Care Instructions Your Care Instructions Colorectal cancer occurs in the colon or rectum.  That's the lower part of your digestive system. It is the second-leading cause of cancer deaths in the [place]. It often starts with small growths called polyps in the colon or rectum. Polyps are usually found with screening tests. Depending on the type of test, any polyps found may be removed during the tests. Colorectal cancer usually does not cause symptoms at first. But regular tests can help find it early, before it spreads and becomes harder to treat. Experts advise routine tests for colon cancer for people starting at age 48. And they advise people with a higher risk of colon cancer to get tested sooner. Talk with your doctor about when you should start testing. Discuss which tests you need. Follow-up care is a key part of your treatment and safety. Be sure to make and go to all appointments, and call your doctor if you are having problems. It's also a good idea to know your test results and keep a list of the medicines you take. What are the main screening tests for colon cancer? · Stool tests. These include the fecal immunochemical test (FIT) and the fecal occult blood test (FOBT). These tests check stool samples for signs of cancer. If your test is positive, you will need to have a colonoscopy. · Sigmoidoscopy. This test lets your doctor look at the lining of your rectum and the lowest part of your colon. Your doctor uses a lighted tube called a sigmoidoscope. This test can't find cancers or polyps in the upper part of your colon. In some cases, polyps that are found can be removed. But if your doctor finds polyps, you will need to have a colonoscopy to check the upper part of your colon. · Colonoscopy. This test lets your doctor look at the lining of your rectum and your entire colon. The doctor uses a thin, flexible tool called a colonoscope. It can also be used to remove polyps or get a tissue sample (biopsy). What tests do you need?  
The following guidelines are for people age 48 and over who are not at high risk for colorectal cancer. You may have at least one of these tests as directed by your doctor. · Fecal immunochemical test (FIT) or fecal occult blood test (FOBT) every year · Sigmoidoscopy every 5 years · Colonoscopy every 10 years If you are age 68 to 80, you can work with your doctor to decide if screening is a good option. If you are age 80 or older, your doctor will likely advise that screening is not helpful. Talk with your doctor about when you need to be tested. And discuss which tests are right for you. Your doctor may recommend earlier or more frequent testing if you: 
· Have had colorectal cancer before. · Have had colon polyps. · Have symptoms of colorectal cancer. These include blood in your stool and changes in your bowel habits. · Have a parent, brother or sister, or child with colon polyps or colorectal cancer. · Have a bowel disease. This includes ulcerative colitis and Crohn's disease. · Have a rare polyp syndrome that runs in families, such as familial adenomatous polyposis (FAP). · Have had radiation treatments to the belly or pelvis. When should you call for help? Watch closely for changes in your health, and be sure to contact your doctor if: 
  · You have any changes in your bowel habits.  
  · You have any problems. Where can you learn more? Go to http://niraj-dara.info/. Enter M541 in the search box to learn more about \"Colon Cancer Screening: Care Instructions. \" Current as of: May 12, 2017 Content Version: 11.7 © 9106-2338 [employer]. Care instructions adapted under license by [employer] (which disclaims liability or warranty for this information). If you have questions about a medical condition or this instruction, always ask your healthcare professional. [place] [address] any warranty or liability for your use of this information. Introducing \[facility] [place] [facility]\"" & HEALTH SERVICES! Melvi Odonnell introduces [employer] patient portal. Now you can access parts of your medical record, email your doctor's office, and request medication refills online. 1. In your internet browser, go to [email or web address] 2. Click on the First Time User? Click Here link in the Sign In box. You will see the New Member Sign Up page. 3. Enter your [employer] Star Access Code exactly as it appears below. You will not need to use this code after youve completed the sign-up process. If you do not sign up before the expiration date, you must request a new code. · [employer] Access Code: [email or web address] Expires: 11/19/2018 11:01 AM 
 
4. Enter the last four digits of your Social Security Number (xxxx) and Date of Birth (mm/dd/yyyy) as indicated and click Submit. You will be taken to the next sign-up page. 5. Create a Nine Star ID. This will be your [employer] Star login ID and cannot be changed, so think of one that is secure and easy to remember. 6. Create a [employer] Star password. You can change your password at any time. 7. Enter your Password Reset Question and Answer. This can be used at a later time if you forget your password. 8. Enter your e-mail address. You will receive e-mail notification when new information is available in [address] 9. Click Sign Up. You can now view and download portions of your medical record. 10. Click the Download Summary menu link to download a portable copy of your medical information. If you have questions, please visit the Frequently Asked Questions section of the [employer] Star website. Remember, [employer] is NOT to be used for urgent needs. For medical emergencies, dial 911. Now available from your iPhone and Android! Please provide this summary of care documentation to your next provider. If you have any questions after today's visit, please call [phone].

## 2018-09-17 NOTE — PATIENT INSTRUCTIONS
Colon Cancer Screening: Care Instructions Your Care Instructions Colorectal cancer occurs in the colon or rectum. That's the lower part of your digestive system. It is the second-leading cause of cancer deaths in the [place]. It often starts with small growths called polyps in the colon or rectum. Polyps are usually found with screening tests. Depending on the type of test, any polyps found may be removed during the tests. Colorectal cancer usually does not cause symptoms at first. But regular tests can help find it early, before it spreads and becomes harder to treat. Experts advise routine tests for colon cancer for people starting at age 48. And they advise people with a higher risk of colon cancer to get tested sooner. Talk with your doctor about when you should start testing. Discuss which tests you need. Follow-up care is a key part of your treatment and safety. Be sure to make and go to all appointments, and call your doctor if you are having problems. It's also a good idea to know your test results and keep a list of the medicines you take. What are the main screening tests for colon cancer? · Stool tests. These include the fecal immunochemical test (FIT) and the fecal occult blood test (FOBT). These tests check stool samples for signs of cancer. If your test is positive, you will need to have a colonoscopy. · Sigmoidoscopy. This test lets your doctor look at the lining of your rectum and the lowest part of your colon. Your doctor uses a lighted tube called a sigmoidoscope. This test can't find cancers or polyps in the upper part of your colon. In some cases, polyps that are found can be removed. But if your doctor finds polyps, you will need to have a colonoscopy to check the upper part of your colon. · Colonoscopy. This test lets your doctor look at the lining of your rectum and your entire colon.  The doctor uses a thin, flexible tool called a colonoscope. It can also be used to remove polyps or get a tissue sample (biopsy). What tests do you need? The following guidelines are for people age 48 and over who are not at high risk for colorectal cancer. You may have at least one of these tests as directed by your doctor. · Fecal immunochemical test (FIT) or fecal occult blood test (FOBT) every year · Sigmoidoscopy every 5 years · Colonoscopy every 10 years If you are age 68 to 80, you can work with your doctor to decide if screening is a good option. If you are age 80 or older, your doctor will likely advise that screening is not helpful. Talk with your doctor about when you need to be tested. And discuss which tests are right for you. Your doctor may recommend earlier or more frequent testing if you: 
· Have had colorectal cancer before. · Have had colon polyps. · Have symptoms of colorectal cancer. These include blood in your stool and changes in your bowel habits. · Have a parent, brother or sister, or child with colon polyps or colorectal cancer. · Have a bowel disease. This includes ulcerative colitis and Crohn's disease. · Have a rare polyp syndrome that runs in families, such as familial adenomatous polyposis (FAP). · Have had radiation treatments to the belly or pelvis. When should you call for help? Watch closely for changes in your health, and be sure to contact your doctor if: 
  · You have any changes in your bowel habits.  
  · You have any problems. Where can you learn more? Go to http://niraj-dara.info/. Enter M541 in the search box to learn more about \"Colon Cancer Screening: Care Instructions. \" Current as of: May 12, 2017 Content Version: 11.7 © 0942-2965 [employer]. Care instructions adapted under license by [employer] (which disclaims liability or warranty for this information).  If you have questions about a medical condition or this instruction, always ask your healthcare professional. [place] [address] any warranty or liability for your use of this information.

## 2018-09-17 NOTE — PROGRESS NOTES
HPI: 
Sherrell Benjamin is a 34 y.o. female presenting for well woman exam.  
 
Acute complaints: Concern that her hemoglobin and iron levels are low. Hx of gastric bypass in 2007/8. Takes ferrous fumerate. Hgb levels have been dropping over the past 1 year. No additional work up in the past.  
 
Vitamin D low at end of summer. Lost 10 lbs over the past 10 month. Exercise: None; mostly housework. Diet: Tries to eat healthy, balanced diet. Drinks water, coffee and soda GYN:  LMP 9/10/18. Light, short, 3 days for periods. Regular, predictable cycles. [id number]. Sexual: No hx of STDs. Currently sexually active with 1 partner ().  s/p vasectomy as method of birth control. Psych: Managing stress well. Denies feelings of depressed mood. Enjoys usual activities. 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 Health Maintenance - reviewed: 
Pap (age 21-65): 2013 - Normal without HPV testing. \"Torn cervix\" due to tear during delivery, and multiple circlages. Mammogram (age 54-69): Mammogram at age 26, normal.  Family hx of breast cancer in aunts, grandmothers on fathers side. Mother and maternal aunts do not have a hx of breast or GYN cancers. Colonoscopy (age 54-65): Mother diagnosed with rectal cancer 1 month ago. Mother has history of lots of polyps. Uncle with colon cancer. Low Dose CT Lung (age 46-80 with 30ppy hx and current smoker or quit < 15 yrs): Not indicated DEXA (>/= 71 yo or sooner with RF): Not indicated HIV screening: Declined Allergies- reviewed: Allergies Allergen Reactions  Pcn [Penicillins] Anaphylaxis  Codeine Nausea and Vomiting Patient states it causes intractable vomiting  Doxycycline Rash Medications- reviewed:  
Current Outpatient Prescriptions Medication Sig  pramipexole (MIRAPEX) 0.125 mg tablet TK 1 T PO  BEFORE BEDTIME QD  
 indomethacin (INDOCIN) 50 mg capsule TK 1 C PO BID WF OR MILK  methocarbamol (ROBAXIN) 500 mg tablet TAKE 1 AND 1/2 TS PO ONCE A DAY  clobetasol (TEMOVATE) 0.05 % topical cream ELICEO EXT AA QD PRN  
 traMADol (ULTRAM) 50 mg tablet Take 50 mg by mouth every six (6) hours as needed for Pain.  cholecalciferol (VITAMIN D3) 50,000 unit capsule Take  by mouth every seven (7) days.  ferrous fumarate 324 mg (106 mg iron) tab Take  by mouth.  gabapentin (NEURONTIN) 300 mg capsule One tab po in the am and two tabs in the evening  Indications: NEUROPATHIC PAIN No current facility-administered medications for this visit. Past Medical History- reviewed: 
Past Medical History:  
Diagnosis Date  Anemia  Cervical incompetence s/p cerclage with last pregnancy  Chiari malformation type I ([facility] [address] 2012  Fibromyalgia  History of  delivery   
 due to cervical incompetency-36 wks, 32 wks, and 37 wks deliveries  Lupus 2003  Lupus  Osteoarthritis  Other torticollis  Peripheral neuropathy  Raynaud phenomenon Past Surgical History- reviewed:  
Past Surgical History:  
Procedure Laterality Date  HX GASTRIC BYPASS    HX ORTHOPAEDIC Left finger surgery  HX TONSILLECTOMY   Social History- reviewed: 
Social History Social History  Marital status:  Spouse name: N/A  
 Number of children: N/A  
 Years of education: N/A Occupational History  Not on file. Social History Main Topics  Smoking status: Never Smoker  Smokeless tobacco: Never Used  Alcohol use No  
 Drug use: No  
 Sexual activity: Yes  
  Partners: Male Birth control/ protection: None Other Topics Concern  Not on file Social History Narrative Immunizations- reviewed: There is no immunization history on file for this patient. Flu vaccine : Waiting later in the season Tdap : 2018 Review of systems:  Items bolded if positive. Constitutional: Fever, chills, night sweats, weight loss, lymphadenopathy, fatigue HEENT: Vision change, eye pain, rhinorrhea, sinus pain, epistaxis, dysphagia, change in hearing, tinnitus, vertigo. Endocrine: Weight change, heat/ cold intolerance, tremor, insomnia, polyuria, polydipsia, polyphagia, abnl hair growth, nail changes Cardiovascular: Chest pain, palpitations, syncope, lower extremity edema, orthopnea, paroxysmal nocturnal dyspnea Pulmonary: Shortness of breath, dyspnea on exertion, cough, hemoptysis, wheezing GI: Nausea, vomiting, diarrhea, melena, hematochezia, change in appetite, abdominal pain, change in bowel habits or stools : Dysuria, frequency, urgency, incontinence, hematuria, nocturia Musculoskeletal: joint swelling or pain, muscle pain, back pain Skin:  Rash, New/growing/changing skin lesions Neurologic: Headache, muscle weakness, paresthesias, anesthesia, ataxia, change in speech, change in gait Psychiatric: depression, anxiety, hallucinations, geovanny, SI/HI Physical Exam 
Visit Vitals  BP 97/60 (BP 1 Location: Right arm, BP Patient Position: Sitting)  Pulse 60  Temp 98.3 °F (36.8 °C) (Oral)  Resp 16  
 Ht 5' 7.75\" (1.721 m)  Wt 175 lb (79.4 kg)  LMP 09/12/2018 (Approximate)  SpO2 100%  BMI 26.81 kg/m2 General appearance - alert, well appearing, and in no distress Eyes - pupils equal and reactive, extraocular eye movements intact Ears - bilateral TM's and external ear canals normal 
Nose - normal and patent, no erythema, discharge or polyps Mouth - mucous membranes moist, pharynx normal without lesions Neck - supple, no significant adenopathy Chest - clear to auscultation, no wheezes, rales or rhonchi, symmetric air entry Heart - normal rate, regular rhythm, normal S1, S2, no murmurs, rubs, clicks or gallops Abdomen - soft, nontender, nondistended, no masses or organomegaly Neurological - alert, oriented, normal speech, no focal findings or movement disorder noted Musculoskeletal - no joint tenderness, deformity or swelling Extremities - peripheral pulses normal, no pedal edema, no clubbing or cyanosis Skin - normal coloration and turgor, no rashes, no suspicious skin lesions noted Pelvic - Exam chaperoned by Jessica Langley LPN. External genitalia normal without rashes or lesions. Pink and moist vaginal mucosa. Scant white discharge. Cervix without lesions or abnormal discharge. Uterus non tender and normal size. No adnexal masses or tenderness. Assessment/Plan: Ms. Katie Zarco is a 34 y.o. female presenting for well woman health maintenance visit. · Counseled on importance of healthy diet, regular exercise, healthy lifestyle (i.e. Safe sex practices, seatbelt safety, wearing sunscreen, etc.) · Pap smear done today with HPV testing · Mammogram ordered · Colonoscopy ordered given recent issues with anemia as well as recent diagnosis of rectal cancer in mother and known prior history of \"numerous polyps\" in mother. · Labs ordered: None. Reviewed recent labs. · Referred to Neurology for migraines · Referred to Hem/Onc for anemia. Hgb 9.9 --> 8.8 since Jan. No known source at this point. She is 10 yrs out from gastric bypass and never had issues with anemia until the past 1 year. · Follow-up: Return for yearly wellness visits Orders Placed This Encounter  ANT MAMMO BI SCREENING INCL CAD Standing Status:   Future Standing Expiration Date:   10/17/2019 Order Specific Question:   Reason for Exam  
  Answer:   Breast cancer screening Order Specific Question:   Is Patient Pregnant? Answer:   No  
 REFERRAL TO HEMATOLOGY Referral Priority:   Routine Referral Type:   Consultation Referral Reason:   Specialty Services Required  Jerlyn Gilford [facility] Referral Priority:   Routine Referral Type:   Consultation Referral Reason:   Specialty Services Required Referral Location:   [facility] Referred to Provider:   Jamie Diggs MD  
 REFERRAL TO HEMATOLOGY Referral Priority:   Routine Referral Type:   Consultation Referral Reason:   Specialty Services Required Referred to Provider:   MD Brent Oliver Neurology ref [facility] Referral Priority:   Routine Referral Type:   Consultation Referral Reason:   Specialty Services Required Referred to Provider:   Silverio Briggs MD  
 pramipexole (MIRAPEX) 0.125 mg tablet Sig: TK 1 T PO  BEFORE BEDTIME QD Dispense:  90 Tab Refill:  0  
 PAP IG, APTIMA HPV AND RFX 16/18,45 ([id number]) Order Specific Question:   Pap Source? Answer:   Cervical and Endocervical  
  Order Specific Question:   Total Hysterectomy? Answer:   No  
  Order Specific Question:   Supracervical Hysterectomy? Answer:   No  
  Order Specific Question:   Post Menopausal?  
  Answer:   No  
  Order Specific Question:   Hormone Therapy? Answer:   No  
  Order Specific Question:   IUD? Answer:   No  
  Order Specific Question:   Abnormal Bleeding? Answer:   No  
  Order Specific Question:   Pregnant Answer:   No  
  Order Specific Question:   Post Partum? Answer:   No  
 
 
 
I have discussed the diagnosis with the patient and the intended plan as seen in the above orders. The patient has received an after-visit summary and questions were answered concerning future plans. Informed pt to return to the office if new symptoms arise.  
 
 
Tameka Buchanan MD

## 2018-09-20 ENCOUNTER — HOSPITAL ENCOUNTER (OUTPATIENT)
Dept: MAMMOGRAPHY | Age: 38
Discharge: HOME | End: 2018-09-20
Attending: FAMILY MEDICINE
Payer: COMMERCIAL

## 2018-09-20 DIAGNOSIS — Z01.419 WELL WOMAN EXAM WITH ROUTINE GYNECOLOGICAL EXAM: ICD-10-CM

## 2018-09-20 PROCEDURE — 77063 BREAST TOMOSYNTHESIS BI: CPT

## 2018-09-20 PROCEDURE — 77067 SCR MAMMO BI INCL CAD: CPT

## 2018-09-24 NOTE — PROGRESS NOTES
Normal pap with negative HPV  Repeat in 5 years  Please call and notify patient Refill & Pharmacy listed below.

## 2018-09-25 ENCOUNTER — TELEPHONE (OUTPATIENT)
Dept: FAMILY MEDICINE CLINIC | Age: 38
End: 2018-09-25

## 2018-09-25 RX ORDER — GABAPENTIN 300 MG/1
CAPSULE ORAL
Qty: 90 CAP | Refills: 0 | Status: SHIPPED | OUTPATIENT
Start: 2018-09-25 | End: 2018-10-25 | Stop reason: SDUPTHER

## 2018-09-25 NOTE — TELEPHONE ENCOUNTER
Verified patient by 2 identifiers. Informed patient that her Pap was normal and negative HPV. Repeat Pap in 5 years. Patient verbalized understanding.

## 2018-10-03 ENCOUNTER — TELEPHONE (OUTPATIENT)
Dept: FAMILY MEDICINE CLINIC | Age: 38
End: 2018-10-03

## 2018-10-03 NOTE — TELEPHONE ENCOUNTER
Attempted to call patient per Dr Albert Chandra in regards to lab results. Unable to reach patient, LVM to call the office.

## 2018-10-03 NOTE — PROGRESS NOTES
Attempted to call patient per Dr Ruiz Friends in regards to lab results. Unable to reach patient, LVM to call the office.

## 2018-10-04 ENCOUNTER — TELEPHONE (OUTPATIENT)
Dept: FAMILY MEDICINE CLINIC | Age: 38
End: 2018-10-04

## 2018-10-04 NOTE — TELEPHONE ENCOUNTER
Attempted to call patient unable to reach. LVM to call the office. Will be sending a letter out for results.

## 2018-10-04 NOTE — PROGRESS NOTES
Attempted to call patient unable to reach. LVM to call the office. Will be sending a letter out in regards to [facility] results.

## 2018-10-16 ENCOUNTER — OFFICE VISIT (OUTPATIENT)
Dept: ONCOLOGY | Age: 38
End: 2018-10-16

## 2018-10-16 VITALS
OXYGEN SATURATION: 100 % | SYSTOLIC BLOOD PRESSURE: 128 MMHG | WEIGHT: 178.4 LBS | RESPIRATION RATE: 18 BRPM | HEART RATE: 64 BPM | TEMPERATURE: 97.2 F | HEIGHT: 68 IN | BODY MASS INDEX: 27.04 KG/M2 | DIASTOLIC BLOOD PRESSURE: 75 MMHG

## 2018-10-16 DIAGNOSIS — D50.8 IRON DEFICIENCY ANEMIA DUE TO DIETARY CAUSES: Primary | ICD-10-CM

## 2018-10-16 DIAGNOSIS — Z98.84 HISTORY OF GASTRIC BYPASS: ICD-10-CM

## 2018-10-16 DIAGNOSIS — M62.838 MUSCLE SPASMS OF NECK: ICD-10-CM

## 2018-10-16 DIAGNOSIS — R19.5 CLAY-COLORED STOOLS: ICD-10-CM

## 2018-10-16 PROBLEM — D50.9 IRON DEFICIENCY ANEMIA: Status: ACTIVE | Noted: 2018-10-16

## 2018-10-16 RX ORDER — HYDROCORTISONE SODIUM SUCCINATE 100 MG/2ML
100 INJECTION, POWDER, FOR SOLUTION INTRAMUSCULAR; INTRAVENOUS AS NEEDED
Status: CANCELLED | OUTPATIENT
Start: 2018-11-12

## 2018-10-16 RX ORDER — SODIUM CHLORIDE 9 MG/ML
25 INJECTION, SOLUTION INTRAVENOUS CONTINUOUS
Status: CANCELLED
Start: 2018-11-12

## 2018-10-16 RX ORDER — EPINEPHRINE 1 MG/ML
0.3 INJECTION, SOLUTION, CONCENTRATE INTRAVENOUS AS NEEDED
Status: CANCELLED | OUTPATIENT
Start: 2018-11-12

## 2018-10-16 RX ORDER — SODIUM CHLORIDE 0.9 % (FLUSH) 0.9 %
10 SYRINGE (ML) INJECTION AS NEEDED
Status: CANCELLED
Start: 2018-11-12

## 2018-10-16 RX ORDER — ONDANSETRON 2 MG/ML
8 INJECTION INTRAMUSCULAR; INTRAVENOUS AS NEEDED
Status: CANCELLED | OUTPATIENT
Start: 2018-11-12

## 2018-10-16 RX ORDER — ONDANSETRON 2 MG/ML
8 INJECTION INTRAMUSCULAR; INTRAVENOUS AS NEEDED
Status: CANCELLED | OUTPATIENT
Start: 2018-10-23

## 2018-10-16 RX ORDER — ALBUTEROL SULFATE 0.83 MG/ML
2.5 SOLUTION RESPIRATORY (INHALATION) AS NEEDED
Status: CANCELLED
Start: 2018-11-12

## 2018-10-16 RX ORDER — HEPARIN 100 UNIT/ML
300-500 SYRINGE INTRAVENOUS AS NEEDED
Status: CANCELLED
Start: 2018-10-23

## 2018-10-16 RX ORDER — ACETAMINOPHEN 325 MG/1
650 TABLET ORAL AS NEEDED
Status: CANCELLED
Start: 2018-11-12

## 2018-10-16 RX ORDER — SODIUM CHLORIDE 0.9 % (FLUSH) 0.9 %
10 SYRINGE (ML) INJECTION AS NEEDED
Status: CANCELLED
Start: 2018-10-23

## 2018-10-16 RX ORDER — DIPHENHYDRAMINE HYDROCHLORIDE 50 MG/ML
50 INJECTION, SOLUTION INTRAMUSCULAR; INTRAVENOUS AS NEEDED
Status: CANCELLED
Start: 2018-10-23

## 2018-10-16 RX ORDER — SODIUM CHLORIDE 9 MG/ML
10 INJECTION INTRAMUSCULAR; INTRAVENOUS; SUBCUTANEOUS AS NEEDED
Status: CANCELLED | OUTPATIENT
Start: 2018-11-12

## 2018-10-16 RX ORDER — HYDROCORTISONE SODIUM SUCCINATE 100 MG/2ML
100 INJECTION, POWDER, FOR SOLUTION INTRAMUSCULAR; INTRAVENOUS AS NEEDED
Status: CANCELLED | OUTPATIENT
Start: 2018-10-23

## 2018-10-16 RX ORDER — ALBUTEROL SULFATE 0.83 MG/ML
2.5 SOLUTION RESPIRATORY (INHALATION) AS NEEDED
Status: CANCELLED
Start: 2018-10-23

## 2018-10-16 RX ORDER — ACETAMINOPHEN 325 MG/1
650 TABLET ORAL AS NEEDED
Status: CANCELLED
Start: 2018-10-23

## 2018-10-16 RX ORDER — HEPARIN 100 UNIT/ML
300-500 SYRINGE INTRAVENOUS AS NEEDED
Status: CANCELLED
Start: 2018-11-12

## 2018-10-16 RX ORDER — DIPHENHYDRAMINE HYDROCHLORIDE 50 MG/ML
50 INJECTION, SOLUTION INTRAMUSCULAR; INTRAVENOUS AS NEEDED
Status: CANCELLED
Start: 2018-11-12

## 2018-10-16 RX ORDER — SODIUM CHLORIDE 9 MG/ML
10 INJECTION INTRAMUSCULAR; INTRAVENOUS; SUBCUTANEOUS AS NEEDED
Status: CANCELLED | OUTPATIENT
Start: 2018-10-23

## 2018-10-16 RX ORDER — EPINEPHRINE 1 MG/ML
0.3 INJECTION, SOLUTION, CONCENTRATE INTRAVENOUS AS NEEDED
Status: CANCELLED | OUTPATIENT
Start: 2018-10-23

## 2018-10-16 RX ORDER — SODIUM CHLORIDE 9 MG/ML
25 INJECTION, SOLUTION INTRAVENOUS CONTINUOUS
Status: CANCELLED
Start: 2018-10-23

## 2018-10-16 NOTE — MR AVS SNAPSHOT
Jeffrey Ramirez 
 
 
 [address] [place] [address], [address] 
[phone] Patient: Akash Lu MRN: [id number] PJU:7/77/2022 Visit Information Date & Time Provider Department Dept. Phone Encounter #  
 10/16/2018  9:00 AM Elyssa Frias MD [facility] [phone] Follow-up Instructions Return in about 2 months (around 12/17/2018) for Anemia f/u, Thorn. Your Appointments 11/5/2018  2:00 PM  
New Patient with Lisa Quan MD  
[facility]) Appt Note: \"chiari malformation' neuropathy ,pinched nerve in neck and back, migraines ref by Dr. Neena Maldonado [phone]) $35CPcc  yd 10/15/18 appt scheduled by pt  
 [address] [place] [address] [place] [address] [phone]  
  
   
 [facility], [facility]  
  
    
 12/17/2018  8:30 AM  
ESTABLISHED PATIENT with Elyssa Frias MD [facility]  
[address]) Appt Note: 2 mo furosa m [address] [facility] [place] [address]  
[phone]  
  
   
 [address] [place] [address], [facility] [address] Upcoming Health Maintenance Date Due DTaP/Tdap/Td series (1 - Tdap) 10/17/2018* Influenza Age 5 to Adult 11/17/2018* PAP AKA CERVICAL CYTOLOGY 9/17/2021 *Topic was postponed. The date shown is not the original due date. Allergies as of 10/16/2018  Review Complete On: 10/16/2018 By: Kaden Larios RN Severity Noted Reaction Type Reactions Pcn [Penicillins] High 08/21/2018    Anaphylaxis Codeine  08/21/2018    Nausea and Vomiting Patient states it causes intractable vomiting Doxycycline  08/21/2018    Rash Current Immunizations  Never Reviewed No immunizations on file. Not reviewed this visit You Were Diagnosed With   
  
 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  
 27.33 kg/m 2 1.97 m 2 Preferred Pharmacy Pharmacy Name Phone [facility] [place], [address] [place] AT [facility] [phone] Your Updated Medication List  
  
   
This list is accurate as of 10/16/18  9:56 AM.  Always use your most recent med list.  
  
  
  
  
 cholecalciferol 50,000 unit capsule Commonly known as:  VITAMIN D3 Take  by mouth every seven (7) days. clobetasol 0.05 % topical cream  
Commonly known as:  TEMOVATE  
ELICEO EXT AA QD PRN  
  
 ferrous fumarate 324 mg (106 mg iron) Tab Take  by mouth.  
  
 gabapentin 300 mg capsule Commonly known as:  NEURONTIN  
TAKE ONE CAPSULE BY MOUTH EVERY MORNING AND 2 CAPSULES BY MOUTH EVERY EVENING  
  
 indomethacin 50 mg capsule Commonly known as:  INDOCIN  
TK 1 C PO BID WF OR MILK  
  
 methocarbamol 500 mg tablet Commonly known as:  ROBAXIN  
TAKE 1 AND 1/2 TS PO ONCE A DAY  
  
 pramipexole 0.125 mg tablet Commonly known as:  MIRAPEX TK 1 T PO  BEFORE BEDTIME QD  
  
 PRENATAL 1 + IRON PO Take 1 Tab by mouth daily. traMADol 50 mg tablet Commonly known as:  ULTRAM  
Take 50 mg by mouth every six (6) hours as needed for Pain. VITAMIN C PO Take 60 mg by mouth two (2) times a day. We Performed the Following CBC WITH AUTOMATED DIFF [22233 CPT(R)] FERRITIN [97539 CPT(R)] IRON PROFILE [id number] CPT(R)] Follow-up Instructions Return in about 2 months (around 12/17/2018) for Anemia f/u, Margarita. Introducing [facility]! [facility] introduces Authorly patient portal. Now you can access parts of your medical record, email your doctor's office, and request medication refills online. 1. In your internet browser, go to [email or web address] 2. Click on the First Time User? Click Here link in the Sign In box. You will see the New Member Sign Up page. 3. Enter your Authorly Access Code exactly as it appears below. You will not need to use this code after youve completed the sign-up process. If you do not sign up before the expiration date, you must request a new code. · Authorly Access Code: [email or web address] Expires: 11/19/2018 11:01 AM 
 
4. Enter the last four digits of your Social Security Number (xxxx) and Date of Birth (mm/dd/yyyy) as indicated and click Submit. You will be taken to the next sign-up page. 5. Create a Authorly ID. This will be your Authorly login ID and cannot be changed, so think of one that is secure and easy to remember. 6. Create a Authorly password. You can change your password at any time. 7. Enter your Password Reset Question and Answer. This can be used at a later time if you forget your password. 8. Enter your e-mail address. You will receive e-mail notification when new information is available in [address] 9. Click Sign Up. You can now view and download portions of your medical record. 10. Click the Download Summary menu link to download a portable copy of your medical information. If you have questions, please visit the Frequently Asked Questions section of the Authorly website. Remember, Authorly is NOT to be used for urgent needs. For medical emergencies, dial 911. Now available from your iPhone and Android! Please provide this summary of care documentation to your next provider. Your primary care clinician is listed as [address]. If you have any questions after today's visit, please call [phone].

## 2018-10-16 NOTE — PROGRESS NOTES
Silvestre Ceron is a 34 y.o. female  Chief Complaint   Patient presents with    New Patient     microcytic anemia referral by Dr. Justine Stearns

## 2018-10-16 NOTE — PROGRESS NOTES
[id number] [facility] Oncology at [address] [place] [address]  [phone]    Hematology / Oncology Consult    Reason for Visit:   Salma Laguerre is a 34 y.o. female who is seen in consultation at the request of Dr. Yon Santacruz for evaluation of anemia. History of Present Illness:   Ms. Harman Reyes is a 33 y/o female referred by Dr. Yon Santacruz for anemia. Review of labs here reveals Hgb 8.8 in 2018. She just moved to [place] over the summer from [facility]. She states that last year, her Hgb was 12.4 and has been gradually downtrending. She had a gastric bypass in  and never received iron infusions in the past. She took several supplements for 4 yrs. She started on iron supplements in Oct/2017 once a day. Since 2018, she has been taking the iron supplements twice a day (a special formulation for gastric bypass patients). She is also taking a prenatal vitamin daily. No stomach upset or constipation. No ice cravings. She reports very light menstrual periods with 1 day saturating 4-6 tampons and the other days are very light. No melena/hematochezia/hematuria. She states she often has yellow greasy stools. She states she has lost weight of approx 14 lbs in the past 4 months. No dietary changes, but reports a mildly decreased appetite. She also takes Indomethacin every night to avoid night spasms related to cervical torticollis and cervical chiari malformation.     Past Medical History:   Diagnosis Date    Anemia     Cervical incompetence     s/p cerclage with last pregnancy    Chiari malformation type I ([address] 2012    Fibromyalgia     History of  delivery     due to cervical incompetency-36 wks, 32 wks, and 37 wks deliveries    Lupus 2003    Lupus     Osteoarthritis     Other torticollis     Peripheral neuropathy     Raynaud phenomenon       Past Surgical History:   Procedure Laterality Date    HX GASTRIC BYPASS      HX ORTHOPAEDIC      Left finger surgery    HX TONSILLECTOMY        Social History Substance Use Topics    Smoking status: Never Smoker    Smokeless tobacco: Never Used    Alcohol use No      Family History   Problem Relation Age of Onset    Cancer Mother     Coronary Artery Disease Father     Parkinson's Disease Maternal Grandmother    Mother was recently diagnosed with rectal cancer. Maternal aunt has metastatic cancer of unknown primary. Current Outpatient Prescriptions   Medication Sig    gabapentin (NEURONTIN) 300 mg capsule TAKE ONE CAPSULE BY MOUTH EVERY MORNING AND 2 CAPSULES BY MOUTH EVERY EVENING    pramipexole (MIRAPEX) 0.125 mg tablet TK 1 T PO  BEFORE BEDTIME QD    indomethacin (INDOCIN) 50 mg capsule TK 1 C PO BID WF OR MILK    methocarbamol (ROBAXIN) 500 mg tablet TAKE 1 AND 1/2 TS PO ONCE A DAY    clobetasol (TEMOVATE) 0.05 % topical cream ELICEO EXT AA QD PRN    traMADol (ULTRAM) 50 mg tablet Take 50 mg by mouth every six (6) hours as needed for Pain.  cholecalciferol (VITAMIN D3) 50,000 unit capsule Take  by mouth every seven (7) days.  ferrous fumarate 324 mg (106 mg iron) tab Take  by mouth. No current facility-administered medications for this visit. Allergies   Allergen Reactions    Pcn [Penicillins] Anaphylaxis    Codeine Nausea and Vomiting     Patient states it causes intractable vomiting     Doxycycline Rash        Review of Systems: A complete review of systems was obtained, negative except as described above.     Physical Exam:     Visit Vitals    /75 (BP 1 Location: Left arm, BP Patient Position: Sitting)    Pulse 64    Temp 97.2 °F (36.2 °C) (Oral)    Resp 18    Ht 5' 7.75\" (1.721 m)    Wt 178 lb 6.4 oz (80.9 kg)    LMP 10/14/2018 (Exact Date)    SpO2 100%    BMI 27.33 kg/m2     ECOG PS: 0  General: Well developed, no acute distress  Eyes: PERRLA, EOMI, anicteric sclerae  HENT: Atraumatic, OP clear, TMs intact without erythema  Neck: Supple  Lymphatic: No cervical, supraclavicular, axillary or inguinal adenopathy  Respiratory: CTAB, normal respiratory effort  CV: Normal rate, regular rhythm, no murmurs, no peripheral edema  GI: Soft, nontender, nondistended, no masses, no hepatomegaly, no splenomegaly  MS: Normal gait and station. Digits without clubbing or cyanosis. Skin: No rashes, ecchymoses, or petechiae. Normal temperature, turgor, and texture. Neuro/Psych: Alert, oriented. 5/5 strength in all 4 extremities. Appropriate affect, normal judgment/insight. Results:     Lab Results   Component Value Date/Time    WBC 4.1 08/21/2018 11:09 AM    HGB 8.8 (L) 08/21/2018 11:09 AM    HCT 29.2 (L) 08/21/2018 11:09 AM    PLATELET 588 99/05/2086 11:09 AM    MCV 77 (L) 08/21/2018 11:09 AM    ABS. NEUTROPHILS 2.0 08/21/2018 11:09 AM     Lab Results   Component Value Date/Time    Sodium 143 08/21/2018 11:09 AM    Potassium 4.6 08/21/2018 11:09 AM    Chloride 108 (H) 08/21/2018 11:09 AM    CO2 22 08/21/2018 11:09 AM    Glucose 87 08/21/2018 11:09 AM    BUN 11 08/21/2018 11:09 AM    Creatinine 0.62 08/21/2018 11:09 AM    GFR est  08/21/2018 11:09 AM    GFR est non- 08/21/2018 11:09 AM    Calcium 8.6 (L) 08/21/2018 11:09 AM     Lab Results   Component Value Date/Time    Bilirubin, total 0.6 08/21/2018 11:09 AM    ALT (SGPT) 12 08/21/2018 11:09 AM    AST (SGOT) 15 08/21/2018 11:09 AM    Alk.  phosphatase 67 08/21/2018 11:09 AM    Protein, total 6.6 08/21/2018 11:09 AM    Albumin 4.1 08/21/2018 11:09 AM     Lab Results   Component Value Date/Time    Iron 15 (L) 08/21/2018 11:09 AM    TIBC 432 08/21/2018 11:09 AM    Iron % saturation 3 (LL) 08/21/2018 11:09 AM    Ferritin 5 (L) 08/21/2018 11:09 AM       Lab Results   Component Value Date/Time    Vitamin B12 668 08/21/2018 11:09 AM    Folate 7.4 08/21/2018 11:09 AM     Lab Results   Component Value Date/Time    TSH 1.890 08/21/2018 11:09 AM     No results found for: HAMAT, HAAB, HABT, HAAT, HBSAG, HBSB, HBSAT, HBABN, HBCM, HBCAB, HBCAT, XBCABS, HBEAB, HBEAG, XHEPCS, [id number], [address], [address] [place] [address], [id number], [id number], [address] [place] [address], [id number], [facility], [id number], [facility]      Imaging:     Radiology report(s) reviewed. .    Assessment & Plan:   Angel Hawley is a 34 y.o. female comes in for evaluation and management of anemia. 1. Iron deficiency anemia: 2/2 poor absorption from prior gastric bypass. Patient would benefit from parenteral iron. I recommend she continue to take the oral iron as well as she does not have any GI tolerance. -- Injectafer x 2 with repeat CBC on date of 2nd injectafer  -- Return in 8-9 weeks for CBC, iron profile, ferritin and f/u    2. Neck spasms: Patient is upset that her pain regimen was changed by her PCP. She states she is going to see a Neurologist soon. -- On Gabapentin    3. Yellow stools / h/o gastric bypass: Unclear etiology, but possibly due to malabsorption related to her gastric bypass. I agree with GI referral. She likely needs to restart on vitamins. I appreciate the opportunity to participate in Ms. Liang John care.     Signed By: Neptali Mcconnell MD     October 16, 2018

## 2018-10-23 ENCOUNTER — HOSPITAL ENCOUNTER (OUTPATIENT)
Dept: INFUSION THERAPY | Age: 38
Discharge: HOME | End: 2018-10-23
Payer: COMMERCIAL

## 2018-10-23 VITALS
HEART RATE: 81 BPM | DIASTOLIC BLOOD PRESSURE: 61 MMHG | TEMPERATURE: 98.4 F | RESPIRATION RATE: 18 BRPM | SYSTOLIC BLOOD PRESSURE: 128 MMHG

## 2018-10-23 DIAGNOSIS — D50.9 IRON DEFICIENCY ANEMIA, UNSPECIFIED IRON DEFICIENCY ANEMIA TYPE: Primary | ICD-10-CM

## 2018-10-23 PROCEDURE — 74011250636 HC RX REV CODE- 250/636: Performed by: NURSE PRACTITIONER

## 2018-10-23 PROCEDURE — 96365 THER/PROPH/DIAG IV INF INIT: CPT

## 2018-10-23 RX ORDER — HEPARIN 100 UNIT/ML
300-500 SYRINGE INTRAVENOUS AS NEEDED
Status: ACTIVE | OUTPATIENT
Start: 2018-10-23 | End: 2018-10-24

## 2018-10-23 RX ORDER — SODIUM CHLORIDE 9 MG/ML
10 INJECTION INTRAMUSCULAR; INTRAVENOUS; SUBCUTANEOUS AS NEEDED
Status: ACTIVE | OUTPATIENT
Start: 2018-10-23 | End: 2018-10-24

## 2018-10-23 RX ORDER — SODIUM CHLORIDE 0.9 % (FLUSH) 0.9 %
10 SYRINGE (ML) INJECTION AS NEEDED
Status: ACTIVE | OUTPATIENT
Start: 2018-10-23 | End: 2018-10-24

## 2018-10-23 RX ORDER — SODIUM CHLORIDE 9 MG/ML
25 INJECTION, SOLUTION INTRAVENOUS CONTINUOUS
Status: DISCONTINUED | OUTPATIENT
Start: 2018-10-23 | End: 2018-10-27 | Stop reason: HOSPADM

## 2018-10-23 RX ADMIN — Medication 10 ML: at 14:15

## 2018-10-23 RX ADMIN — Medication 10 ML: at 13:24

## 2018-10-23 RX ADMIN — FERRIC CARBOXYMALTOSE INJECTION 750 MG: 50 INJECTION, SOLUTION INTRAVENOUS at 13:54

## 2018-10-23 NOTE — PROGRESS NOTES
[facility] [place] Progress Note Date: 2018 Name: Santosh Loco MRN: [id number] : 1980 Ms. Kraft Arrived ambulatory and in no distress for Dose  Injectafer. Assessment was completed, no acute issues at this time, no new complaints voiced. 24 G PIV established to R arm without difficulty, + blood return. Confirmed with MD office that no labs need to be drawn today. Education regarding new medication reviewed with patient- verbalized understanding. Ms. Kenn Teresa vitals were reviewed. Patient Vitals for the past 12 hrs: 
 Temp Pulse Resp BP  
10/23/18 1306 98.4 °F (36.9 °C) 81 18 128/61 Medications: 
Injectafer IV 
 
 
Ms. Kraft tolerated treatment well and was discharged from [place] [address] in stable condition at. Patient politely declined to stay 30 minutes post infusion for monitoring. PIV flushed & removed. She is to return on 10/30/18  for her next appointment. Bruce William RN 2018

## 2018-10-25 DIAGNOSIS — G62.9 PERIPHERAL POLYNEUROPATHY: ICD-10-CM

## 2018-10-25 RX ORDER — GABAPENTIN 300 MG/1
CAPSULE ORAL
Qty: 90 CAP | Refills: 0 | Status: SHIPPED | OUTPATIENT
Start: 2018-10-25 | End: 2018-11-26 | Stop reason: SDUPTHER

## 2018-10-30 ENCOUNTER — HOSPITAL ENCOUNTER (OUTPATIENT)
Dept: INFUSION THERAPY | Age: 38
Discharge: HOME | End: 2018-10-30
Payer: COMMERCIAL

## 2018-10-30 NOTE — PROGRESS NOTES
[facility] VISIT NOTE Pt arrived at [facility] ambulatory and in no distress for 2/2 Injectafer. Assessment completed, pt c/o . PIV accessed in without difficulty. Labs drawn and sent. Medications received: Injectafer IV Tolerated treatment well, no adverse reaction noted. PIV de-accessed and2x2 and band-aid applied. D/C'd from [facility] ambulatory and in no distress accompanied by .

## 2018-11-05 ENCOUNTER — OFFICE VISIT (OUTPATIENT)
Dept: NEUROLOGY | Age: 38
End: 2018-11-05

## 2018-11-05 VITALS
HEART RATE: 77 BPM | SYSTOLIC BLOOD PRESSURE: 108 MMHG | WEIGHT: 180 LBS | DIASTOLIC BLOOD PRESSURE: 69 MMHG | OXYGEN SATURATION: 99 % | BODY MASS INDEX: 27.57 KG/M2

## 2018-11-05 DIAGNOSIS — M79.7 FIBROMYALGIA: Primary | ICD-10-CM

## 2018-11-05 RX ORDER — TRAMADOL HYDROCHLORIDE 50 MG/1
50 TABLET ORAL DAILY
Qty: 30 TAB | Refills: 0 | Status: SHIPPED | OUTPATIENT
Start: 2018-11-05 | End: 2018-12-10 | Stop reason: SDUPTHER

## 2018-11-05 RX ORDER — METHOCARBAMOL 500 MG/1
TABLET, FILM COATED ORAL
Qty: 45 TAB | Refills: 2 | Status: SHIPPED | OUTPATIENT
Start: 2018-11-05 | End: 2019-01-07 | Stop reason: SDUPTHER

## 2018-11-05 NOTE — PATIENT INSTRUCTIONS
[facility]   Statement to Patients  April 1, 2014      In an effort to ensure the large volume of patient prescription refills is processed in the most efficient and expeditious manner, we are asking our patients to assist us by calling your Pharmacy for all prescription refills, this will include also your  Mail Order Pharmacy. The pharmacy will contact our office electronically to continue the refill process. Please do not wait until the last minute to call your pharmacy. We need at least 48 hours (2days) to fill prescriptions. We also encourage you to call your pharmacy before going to  your prescription to make sure it is ready. With regard to controlled substance prescription refill requests (narcotic refills) that need to be picked up at our office, we ask your cooperation by providing us with at least 72 hours (3days) notice that you will need a refill. We will not refill narcotic prescription refill requests after 4:00pm on any weekday, Monday through Thursday, or after 2:00pm on Fridays, or on the weekends. We encourage everyone to explore another way of getting your prescription refill request processed using [employer], our patient web portal through our electronic medical record system. [employer] is an efficient and effective way to communicate your medication request directly to the office and  downloadable as an isabell on your smart phone . [employer] also features a review functionality that allows you to view your medication list as well as leave messages for your physician. Are you ready to get connected? If so please review the attatched instructions or speak to any of our staff to get you set up right away! Thank you so much for your cooperation. Should you have any questions please contact our Practice Administrator.     The Physicians and Staff,  [facility]

## 2018-11-05 NOTE — PROGRESS NOTES
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 Patient seen by multiple rheumatologist and orthopedic in [place] and [place]. Previously treated with Plaquenil for questionable Lupus and possible fibromyalgia. Patient seen a neurologist in [place] and seen a neurosurgeon who did MRI brain and MRI cervical spine  showing Chiari 1 malformation. Surgery was recommend but declined for now    Tried Cymbalta (caused pupil dilatation). Tried Gabapentin with benefit. Currently on 300 mg in AM and 600 mg at PM.    (+) chronic headache usually base of skull. Occurring 3-4/ week. Had botox and nerve blocks with no benefit. Had ablation  [address]. Tried Topamax.    (+) problem sleeping averaging 5-6 hrs. Chriopractor made things worse. Exercise made symptoms worse    Was given by her neurologist in [place] Robaxin 500 mg 1 1/2 and Tramadol 50 mg every day prn which helped.     ROS   (-) rashes  (-) fever  All other systems reviewed and are negative    Past Medical Hx  Past Medical History:   Diagnosis Date    Anemia     Cervical incompetence     s/p cerclage with last pregnancy    [address] malformation type I ([facility] [address] 2012    Fibromyalgia     Headache     History of  delivery     due to cervical incompetency-36 wks, 32 wks, and 37 wks deliveries    Lupus 2003    Lupus     Osteoarthritis     Other torticollis     Peripheral neuropathy     Raynaud phenomenon    Gastric bypass    Social Hx  Social History     Socioeconomic History    Marital status:      Spouse name: Not on file    Number of children: Not on file    Years of education: Not on file    Highest education level: Not on file   Social Needs    Financial resource strain: Not on file    Food insecurity - worry: Not on file    Food insecurity - inability: Not on file   Unveil needs - medical: Not on file   Unveil needs - non-medical: Not on file   Occupational History    Not on file   Tobacco Use    Smoking status: Never Smoker    Smokeless tobacco: Never Used   Substance and Sexual Activity    Alcohol use: Yes     Comment: Per patient, \"varies 1 - 2 per month\"    Drug use: No    Sexual activity: Yes     Partners: Male     Birth control/protection: None   Other Topics Concern    Not on file   Social History Narrative    Not on file       Family Hx  Family History   Problem Relation Age of Onset    Cancer Mother         rectal    Coronary Artery Disease Father     Parkinson's Disease Maternal Grandmother        ALLERGIES  Allergies   Allergen Reactions    Pcn [Penicillins] Anaphylaxis    Codeine Nausea and Vomiting     Patient states it causes intractable vomiting     Doxycycline Rash       CURRENT MEDS  Current Outpatient Medications   Medication Sig Dispense Refill    methocarbamol (ROBAXIN) 500 mg tablet TAKE 1 AND 1/2 Tab PO ONCE A DAY prn for severe pain 45 Tab 2    traMADol (ULTRAM) 50 mg tablet Take 1 Tab by mouth daily. Max Daily Amount: 50 mg. Take 1 tab prn for severe pain 30 Tab 0    gabapentin (NEURONTIN) 300 mg capsule TAKE 1 CAPSULE BY MOUTH EVERY MORNING AND 2 CAPSULES BY MOUTH EVERY EVENING 90 Cap 0    prenatal vits w-Ca,Fe,FA,1 mg, (PRENATAL 1 + IRON PO) Take 1 Tab by mouth daily.  pramipexole (MIRAPEX) 0.125 mg tablet TK 1 T PO  BEFORE BEDTIME QD 90 Tab 0    indomethacin (INDOCIN) 50 mg capsule TK 1 C PO BID WF OR MILK  0    clobetasol (TEMOVATE) 0.05 % topical cream ELICEO EXT AA QD PRN  1    cholecalciferol (VITAMIN D3) 50,000 unit capsule Take  by mouth every seven (7) days.  ferrous fumarate 324 mg (106 mg iron) tab Take  by mouth.       ascorbate calcium (VITAMIN C PO) Take 60 mg by mouth two (2) times a day. PREVIOUS WORKUP: (reviewed)  IMAGING:    CT Results (recent):  No results found for this or any previous visit. MRI Results (recent):  No results found for this or any previous visit. IR Results (recent):  No results found for this or any previous visit. VAS/US Results (recent):  No results found for this or any previous visit. LABS (reviewed)  Results for orders placed or performed in visit on 08/21/18   CBC WITH AUTOMATED DIFF   Result Value Ref Range    WBC 4.1 3.4 - 10.8 x10E3/uL    RBC 3.80 3.77 - 5.28 x10E6/uL    HGB 8.8 (L) 11.1 - 15.9 g/dL    HCT 29.2 (L) 34.0 - 46.6 %    MCV 77 (L) 79 - 97 fL    MCH 23.2 (L) 26.6 - 33.0 pg    MCHC 30.1 (L) 31.5 - 35.7 g/dL    RDW 17.0 (H) 12.3 - 15.4 %    PLATELET 920 066 - 920 x10E3/uL    NEUTROPHILS 48 Not Estab. %    Lymphocytes 39 Not Estab. %    MONOCYTES 8 Not Estab. %    EOSINOPHILS 4 Not Estab. %    BASOPHILS 1 Not Estab. %    ABS. NEUTROPHILS 2.0 1.4 - 7.0 x10E3/uL    Abs Lymphocytes 1.6 0.7 - 3.1 x10E3/uL    ABS. MONOCYTES 0.3 0.1 - 0.9 x10E3/uL    ABS. EOSINOPHILS 0.2 0.0 - 0.4 x10E3/uL    ABS. BASOPHILS 0.0 0.0 - 0.2 x10E3/uL    IMMATURE GRANULOCYTES 0 Not Estab. %    ABS. IMM.  GRANS. 0.0 0.0 - 0.1 x10E3/uL   IRON PROFILE   Result Value Ref Range    TIBC 432 250 - 450 ug/dL    UIBC 417 131 - 425 ug/dL    Iron 15 (L) 27 - 159 ug/dL    Iron % saturation 3 (LL) 15 - 55 %   FERRITIN   Result Value Ref Range    Ferritin 5 (L) 15 - 659 ng/mL   METABOLIC PANEL, COMPREHENSIVE   Result Value Ref Range    Glucose 87 65 - 99 mg/dL    BUN 11 6 - 24 mg/dL    Creatinine 0.62 0.57 - 1.00 mg/dL    GFR est non- >59 mL/min/1.73    GFR est  >59 mL/min/1.73    BUN/Creatinine ratio 18 9 - 23    Sodium 143 134 - 144 mmol/L    Potassium 4.6 3.5 - 5.2 mmol/L    Chloride 108 (H) 96 - 106 mmol/L    CO2 22 20 - 29 mmol/L    Calcium 8.6 (L) 8.7 - 10.2 mg/dL    Protein, total 6.6 6.0 - 8.5 g/dL    Albumin 4.1 3.5 - 5.5 g/dL    GLOBULIN, TOTAL 2.5 1.5 - 4.5 g/dL    A-G Ratio 1.6 1.2 - 2.2    Bilirubin, total 0.6 0.0 - 1.2 mg/dL    Alk. phosphatase 67 39 - 117 IU/L    AST (SGOT) 15 0 - 40 IU/L    ALT (SGPT) 12 0 - 32 IU/L   VITAMIN D, 25 HYDROXY   Result Value Ref Range    VITAMIN D, 25-HYDROXY 32.7 30.0 - 100.0 ng/mL   HEMOGLOBIN A1C WITH EAG   Result Value Ref Range    Hemoglobin A1c 5.5 4.8 - 5.6 %    Estimated average glucose 111 mg/dL   VITAMIN B12 & FOLATE   Result Value Ref Range    Vitamin B12 668 232 - 1,245 pg/mL    Folate 7.4 >3.0 ng/mL   THYROID PANEL W/TSH   Result Value Ref Range    TSH 1.890 0.450 - 4.500 uIU/mL    T4, Total 6.0 4.5 - 12.0 ug/dL    T3 Uptake 24 24 - 39 %    Free Thyroxine Index (FTI) 1.4 1.2 - 4.9   SED RATE (ESR)   Result Value Ref Range    Sed rate (ESR) 3 0 - 32 mm/hr       Physical Exam:     Visit Vitals  /69   Pulse 77   Wt 81.6 kg (180 lb)   LMP 10/14/2018 (Exact Date)   SpO2 99%   BMI 27.57 kg/m²     General:  Alert, cooperative, no distress. Head:  Normocephalic, without obvious abnormality, atraumatic. Eyes:  Conjunctivae/corneas clear. Lungs:  Heart:   Non labored breathing  Regular rate and rhythm, no carotid bruits   Abdomen:   Soft, non-distended   Extremities: Extremities normal, atraumatic, no cyanosis or edema. Pulses: 2+ and symmetric all extremities. Skin: Skin color, texture, turgor normal. No rashes or lesions.   Neurologic Exam     Gen: Attention normal             Language: naming, repetition, fluency normal             Memory: intact recent and remote memory  Cranial Nerves:  I: smell Not tested   II: visual fields Full to confrontation   II: pupils Equal, round, reactive to light   II: optic disc No papilledema   III,VII: ptosis none   III,IV,VI: extraocular muscles  Full ROM   V: mastication normal   V: facial light touch sensation  normal   VII: facial muscle function   symmetric   VIII: hearing symmetric   IX: soft palate elevation  normal   XI: trapezius strength  5/5   XI: sternocleidomastoid strength 5/5   XI: neck flexion strength  5/5   XII: tongue  midline     Motor: normal bulk and tone, no tremor              Strength: 5/5 all four extremities  (+) multiple tender spots in neck, lower back, hips, shoulders, knees  Sensory: intact to LT, PP, vibration, and JPS  Reflexes: 2+ throughout; Down going toes  Coordination: Good FTN and HTS  Gait: normal gait including tandem            Impression:     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 who comes in with several concerns. Since she was 22 yo, patient started having random muscle aches, neck, lower back and joint pains. Patient seen by multiple rheumatologist and orthopedic in [place] and [place]. Previously treated with Plaquenil for questionable Lupus and possible fibromyalgia. Exam reveals multiple tender spots concerning for fibromyalgia. Patient seen a neurologist in [place] and seen a neurosurgeon who did MRI brain and MRI cervical spine  showing Chiari 1 malformation. Surgery was recommend but declined for now. Also has chronic headaches at the base of the skull. Other meds tried include Cymbalta (caused pupil dilatation), Topamax and Nortriptyline with no benefit. Had botox and nerve blocks with no benefit. Had ablation [address] [place]  Currently on Gabapentin 300 mg in AM and 600 mg at PM. Chriopractor and Exercise made symptoms worse. Was given by her neurologist in [place] Robaxin 500 mg 1 1/2 and Tramadol 50 mg every day prn which helped. RECOMMENDATIONS  1. Patient to get medical records from previous neurologist for my review  2. MRI brain and cervical spine to look for interval change of Chiari 1 Malformation and to look for syrinx.   3. MRI lumbar spine to assess for chronic lower back pain with numbness in both feet  4. Will continue Tramadol 50 mg every day and RObaxin 500 mg 1 1/2 tabs every day prn.  done - no info seen for [place]  5. Will refer for accupuncture  6. Advise to follow up with a rheumatologist to clarify Lupus and fibromyalgia diagnosis    Follow-up Disposition:  Return for review of results.       Thank you for the consultation      Kobe Willis MD  Diplomate, American Board of Psychiatry and Neurology  Diplomate, Neuromuscular Medicine  Diplomate, American Board of Electrodiagnostic Medicine        CC: Nohemi Mccurdy MD  Fax: [phone]

## 2018-11-12 ENCOUNTER — HOSPITAL ENCOUNTER (OUTPATIENT)
Dept: INFUSION THERAPY | Age: 38
Discharge: HOME | End: 2018-11-12
Payer: COMMERCIAL

## 2018-11-12 VITALS
TEMPERATURE: 96.6 F | HEART RATE: 60 BPM | DIASTOLIC BLOOD PRESSURE: 70 MMHG | RESPIRATION RATE: 16 BRPM | SYSTOLIC BLOOD PRESSURE: 116 MMHG

## 2018-11-12 DIAGNOSIS — D50.9 IRON DEFICIENCY ANEMIA, UNSPECIFIED IRON DEFICIENCY ANEMIA TYPE: Primary | ICD-10-CM

## 2018-11-12 LAB
BASOPHILS # BLD: 0.1 K/UL (ref 0–0.1)
BASOPHILS NFR BLD: 1 % (ref 0–1)
COMMENT, HOLDF: NORMAL
DIFFERENTIAL METHOD BLD: ABNORMAL
EOSINOPHIL # BLD: 0.2 K/UL (ref 0–0.4)
EOSINOPHIL NFR BLD: 4 % (ref 0–7)
ERYTHROCYTE [DISTWIDTH] IN BLOOD BY AUTOMATED COUNT: 23 % (ref 11.5–14.5)
FERRITIN SERPL-MCNC: 112 NG/ML (ref 8–252)
HCT VFR BLD AUTO: 37.4 % (ref 35–47)
HGB BLD-MCNC: 11.7 G/DL (ref 11.5–16)
IMM GRANULOCYTES # BLD: 0 K/UL (ref 0–0.04)
IMM GRANULOCYTES NFR BLD AUTO: 0 % (ref 0–0.5)
IRON SATN MFR SERPL: 28 % (ref 20–50)
IRON SERPL-MCNC: 94 UG/DL (ref 35–150)
LYMPHOCYTES # BLD: 2 K/UL (ref 0.8–3.5)
LYMPHOCYTES NFR BLD: 37 % (ref 12–49)
MCH RBC QN AUTO: 27.2 PG (ref 26–34)
MCHC RBC AUTO-ENTMCNC: 31.3 G/DL (ref 30–36.5)
MCV RBC AUTO: 87 FL (ref 80–99)
MONOCYTES # BLD: 0.4 K/UL (ref 0–1)
MONOCYTES NFR BLD: 8 % (ref 5–13)
NEUTS SEG # BLD: 2.8 K/UL (ref 1.8–8)
NEUTS SEG NFR BLD: 50 % (ref 32–75)
NRBC # BLD: 0 K/UL (ref 0–0.01)
NRBC BLD-RTO: 0 PER 100 WBC
PLATELET # BLD AUTO: 202 K/UL (ref 150–400)
PMV BLD AUTO: 11.7 FL (ref 8.9–12.9)
RBC # BLD AUTO: 4.3 M/UL (ref 3.8–5.2)
RBC MORPH BLD: ABNORMAL
RBC MORPH BLD: ABNORMAL
SAMPLES BEING HELD,HOLD: NORMAL
TIBC SERPL-MCNC: 340 UG/DL (ref 250–450)
WBC # BLD AUTO: 5.5 K/UL (ref 3.6–11)

## 2018-11-12 PROCEDURE — 36415 COLL VENOUS BLD VENIPUNCTURE: CPT

## 2018-11-12 PROCEDURE — 74011250636 HC RX REV CODE- 250/636: Performed by: NURSE PRACTITIONER

## 2018-11-12 PROCEDURE — 83540 ASSAY OF IRON: CPT

## 2018-11-12 PROCEDURE — 96365 THER/PROPH/DIAG IV INF INIT: CPT

## 2018-11-12 PROCEDURE — 85025 COMPLETE CBC W/AUTO DIFF WBC: CPT

## 2018-11-12 PROCEDURE — 82728 ASSAY OF FERRITIN: CPT

## 2018-11-12 RX ORDER — SODIUM CHLORIDE 0.9 % (FLUSH) 0.9 %
10 SYRINGE (ML) INJECTION AS NEEDED
Status: ACTIVE | OUTPATIENT
Start: 2018-11-12 | End: 2018-11-12

## 2018-11-12 RX ORDER — SODIUM CHLORIDE 9 MG/ML
10 INJECTION INTRAMUSCULAR; INTRAVENOUS; SUBCUTANEOUS AS NEEDED
Status: ACTIVE | OUTPATIENT
Start: 2018-11-12 | End: 2018-11-12

## 2018-11-12 RX ORDER — HEPARIN 100 UNIT/ML
300-500 SYRINGE INTRAVENOUS AS NEEDED
Status: ACTIVE | OUTPATIENT
Start: 2018-11-12 | End: 2018-11-12

## 2018-11-12 RX ORDER — SODIUM CHLORIDE 9 MG/ML
25 INJECTION, SOLUTION INTRAVENOUS CONTINUOUS
Status: DISCONTINUED | OUTPATIENT
Start: 2018-11-12 | End: 2018-11-16 | Stop reason: HOSPADM

## 2018-11-12 RX ADMIN — FERRIC CARBOXYMALTOSE INJECTION 750 MG: 50 INJECTION, SOLUTION INTRAVENOUS at 11:24

## 2018-11-12 NOTE — PROGRESS NOTES
[facility] Note:  Pt arrived at [facility] ambulatory and in no distress for iron infusion  2/2    Assessment stable, no new complaints voiced. Except tiredness. Medications received:  Iron infusion    *1215Tolerated treatment well, no adverse reaction noted. D/Cd from [facility] ambulatory and in no distress accompanied by self. Next appt none  Visit Vitals  /68   Pulse (!) 59   Temp 96.6 °F (35.9 °C)   Resp 16   LMP 10/14/2018 (Exact Date)     Recent Results (from the past 12 hour(s))   CBC WITH AUTOMATED DIFF    Collection Time: 11/12/18 11:07 AM   Result Value Ref Range    WBC 5.5 3.6 - 11.0 K/uL    RBC 4.30 3.80 - 5.20 M/uL    HGB 11.7 11.5 - 16.0 g/dL    HCT 37.4 35.0 - 47.0 %    MCV 87.0 80.0 - 99.0 FL    MCH 27.2 26.0 - 34.0 PG    MCHC 31.3 30.0 - 36.5 g/dL    RDW 23.0 (H) 11.5 - 14.5 %    PLATELET 348 445 - 162 K/uL    MPV 11.7 8.9 - 12.9 FL    NRBC 0.0 0  WBC    ABSOLUTE NRBC 0.00 0.00 - 0.01 K/uL    NEUTROPHILS PENDING %    LYMPHOCYTES PENDING %    MONOCYTES PENDING %    EOSINOPHILS PENDING %    BASOPHILS PENDING %    IMMATURE GRANULOCYTES PENDING %    ABS. NEUTROPHILS PENDING K/UL    ABS. LYMPHOCYTES PENDING K/UL    ABS. MONOCYTES PENDING K/UL    ABS. EOSINOPHILS PENDING K/UL    ABS. BASOPHILS PENDING K/UL    ABS. IMM. GRANS. PENDING K/UL    DF PENDING    SAMPLES BEING HELD    Collection Time: 11/12/18 11:07 AM   Result Value Ref Range    SAMPLES BEING HELD 1sst     COMMENT        Add-on orders for these samples will be processed based on acceptable specimen integrity and analyte stability, which may vary by analyte.

## 2018-11-12 NOTE — LETTER
11/12/2018 1:04 PM 
 
Ms. Katelyn Wilkinson 
[address] [place] [address] [phone] Dear Katelyn Wilkinson I have reviewed your results and have found the results listed below to be within normal ranges. You are no longer anemic, which is great. Please make sure to continue with all of your scheduled appointments. Red Blood Cells: NORMAL/STABLE My recommendations are as follows: 
None. Keep up the good work! Please call if you have any questions [phone] . Sincerely, Cynthia Valdes M.D. 
Alina Lora <4H

## 2018-11-12 NOTE — PROGRESS NOTES
Problem: Patient Education:  Go to Education Activity  Goal: Patient/Family Education  Outcome: Progressing Towards Goal  Lab

## 2018-11-14 NOTE — PROGRESS NOTES
Patient returned phone call. Informed patient, per Dr. Noelle Berry,  her anemia is much better with improvement in hemoglobin to 11.7. Mailed copy of labs to patient as well. No further questions or concerns at this time.

## 2018-11-19 ENCOUNTER — DOCUMENTATION ONLY (OUTPATIENT)
Dept: NEUROLOGY | Age: 38
End: 2018-11-19

## 2018-11-19 NOTE — PROGRESS NOTES
Reviewed documentation from her neurology in [place]. Patient was being seen by Dr. Winston Luna.   Patient is being treated with gabapentin 900 mg nightly and Robaxin 500 mg 3 times a day to 4 times a day    MRI of the lumbar spine (10/19/16) only showed small central disc protrusion at L4-L5 and L5-S1 without evidence of significant central canal or foraminal stenosis    EMG nerve conduction studies done 10/13/2016 showed normal sural sensory responses in both lower extremities    MRI of the brain done on September 10, 2014 showed Chiari I malformation with cerebellar tonsils projecting approximately 5 mm below foramen magnum  MRI cervical spine done 11/15/2014 showed no acute abnormality of the cervical spine

## 2018-11-23 ENCOUNTER — HOSPITAL ENCOUNTER (OUTPATIENT)
Dept: MRI IMAGING | Age: 38
Discharge: HOME | End: 2018-11-23
Attending: PSYCHIATRY & NEUROLOGY
Payer: COMMERCIAL

## 2018-11-23 DIAGNOSIS — M79.7 FIBROMYALGIA: ICD-10-CM

## 2018-11-23 PROCEDURE — 70551 MRI BRAIN STEM W/O DYE: CPT

## 2018-11-23 PROCEDURE — 72148 MRI LUMBAR SPINE W/O DYE: CPT

## 2018-11-23 PROCEDURE — 72141 MRI NECK SPINE W/O DYE: CPT

## 2018-12-06 ENCOUNTER — TELEPHONE (OUTPATIENT)
Dept: NEUROLOGY | Age: 38
End: 2018-12-06

## 2018-12-10 ENCOUNTER — OFFICE VISIT (OUTPATIENT)
Dept: NEUROLOGY | Age: 38
End: 2018-12-10

## 2018-12-10 VITALS
BODY MASS INDEX: 27.65 KG/M2 | DIASTOLIC BLOOD PRESSURE: 80 MMHG | SYSTOLIC BLOOD PRESSURE: 120 MMHG | TEMPERATURE: 99.8 F | WEIGHT: 180.5 LBS

## 2018-12-10 DIAGNOSIS — G62.9 PERIPHERAL POLYNEUROPATHY: ICD-10-CM

## 2018-12-10 DIAGNOSIS — G43.711 INTRACTABLE CHRONIC MIGRAINE WITHOUT AURA AND WITH STATUS MIGRAINOSUS: ICD-10-CM

## 2018-12-10 DIAGNOSIS — M79.7 FIBROMYALGIA: Primary | ICD-10-CM

## 2018-12-10 RX ORDER — TRAMADOL HYDROCHLORIDE 50 MG/1
50 TABLET ORAL DAILY
Qty: 30 TAB | Refills: 0 | Status: SHIPPED | OUTPATIENT
Start: 2018-12-10

## 2018-12-10 RX ORDER — GABAPENTIN 300 MG/1
CAPSULE ORAL
Qty: 270 CAP | Refills: 1 | Status: SHIPPED | OUTPATIENT
Start: 2018-12-10 | End: 2019-01-07 | Stop reason: SDUPTHER

## 2018-12-10 NOTE — PROGRESS NOTES
Neurology Progress Note    Patient ID:  Kimi Vásquez  [id number]  34 y.o.  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 who comes in with several concerns. Since she was 22 yo, patient started having random muscle aches, neck, lower back and joint pains. Patient seen by multiple rheumatologist and orthopedic in [place] and [place]. Previously treated with Plaquenil for questionable Lupus and possible fibromyalgia. Exam reveals multiple tender spots concerning for fibromyalgia. Also has chronic headaches at the base of the skull. Other meds tried include Cymbalta (caused pupil dilatation), Topamax and Nortriptyline with no benefit. Had botox and nerve blocks with no benefit. Had ablation [address] [place] [address] [place]. Currently on Gabapentin 300 mg in AM and 600 mg at PM. Chriopractor and Exercise made symptoms worse. Was given by her neurologist in [place] Robaxin 500 mg 1 1/2 and Tramadol 50 mg every day prn which helped.     Since her 20's patient is suffering from headache 20/ month, neck area radiating to top of head, 10/10, lasting for several days, triggered by menstrual cylce (+) nausea (+) vomiting (+) photophobia (+) phonophobia (+) scintillating scotomas. Tried Botox twice (only did the posterior head) with no clear benefit. Patient continues to have chronic neck and lower back pain on Robaxin, Tramadol and Gabapentin.       MRI Brain: Normal with no evidence of chiari malformation    ROS:  Per HPI-  Otherwise the remainder of ROS was negative    Social Hx  Social History     Socioeconomic History    Marital status:      Spouse name: Not on file    Number of children: Not on file    Years of education: Not on file    Highest education level: Not on file   Tobacco Use    Smoking status: Never Smoker  Smokeless tobacco: Never Used   Substance and Sexual Activity    Alcohol use: Yes     Comment: Per patient, \"varies 1 - 2 per month\"    Drug use: No    Sexual activity: Yes     Partners: Male     Birth control/protection: None       Meds:  Current Outpatient Medications on File Prior to Visit   Medication Sig Dispense Refill    gabapentin (NEURONTIN) 300 mg capsule TAKE 1 CAPSULE BY MOUTH EVERY MORNING AND 2 CAPSULES BY MOUTH EVERY EVENING 90 Cap 0    methocarbamol (ROBAXIN) 500 mg tablet TAKE 1 AND 1/2 Tab PO ONCE A DAY prn for severe pain 45 Tab 2    traMADol (ULTRAM) 50 mg tablet Take 1 Tab by mouth daily. Max Daily Amount: 50 mg. Take 1 tab prn for severe pain 30 Tab 0    ascorbate calcium (VITAMIN C PO) Take 60 mg by mouth two (2) times a day.  prenatal vits w-Ca,Fe,FA,1 mg, (PRENATAL 1 + IRON PO) Take 1 Tab by mouth daily.  pramipexole (MIRAPEX) 0.125 mg tablet TK 1 T PO  BEFORE BEDTIME QD 90 Tab 0    indomethacin (INDOCIN) 50 mg capsule TK 1 C PO BID WF OR MILK  0    clobetasol (TEMOVATE) 0.05 % topical cream ELICOE EXT AA QD PRN  1    cholecalciferol (VITAMIN D3) 50,000 unit capsule Take  by mouth every seven (7) days.  ferrous fumarate 324 mg (106 mg iron) tab Take  by mouth. No current facility-administered medications on file prior to visit. Imaging:    CT Results (recent):  No results found for this or any previous visit. MRI Results (recent):  Results from [place] encounter on 11/23/18   MRI LUMB SPINE WO CONT    Narrative EXAM:  MRI LUMB SPINE WO CONT    INDICATION:  Low back pain and/or radiculopathy, patient can have  surgery/intervention. Fibromyalgia      Exam: MRI of the lumbar spine. Sequences include sagittal and axial T1 and  T2-weighted images. Sagittal STIR. Comparisons: None    Contrast: None. Findings: Alignment is normal. There is no evidence of marrow replacement or  acute fracture. Cord terminus is within normal limits. Paraspinous soft tissues  are within normal limits. T12-L1: No stenosis    L1-L2: No stenosis    L2-L3: No stenosis    L3-L4: No stenosis    L4-L5: There is posterior annular tearing without disc bulge or stenosis    L5-S1: There is a small central disc protrusion. There is slight degeneration of  the facets. There is borderline narrowing of the subarticular zones. Impression Impression:  1. Small central protrusion L5-S1 with borderline narrowing of the subarticular  zones  2. Annular tearing of the disc L4-5 without stenosis          IR Results (recent):  No results found for this or any previous visit. VAS/US Results (recent):  No results found for this or any previous visit. Reviewed records in [facility] and media tab today    Lab Review     Hospital Outpatient Visit on 11/12/2018   Component Date Value Ref Range Status    Ferritin 11/12/2018 112  8 - 252 NG/ML Final    Iron 11/12/2018 94  35 - 150 ug/dL Final    TIBC 11/12/2018 340  250 - 450 ug/dL Final    Iron % saturation 11/12/2018 28  20 - 50 % Final    WBC 11/12/2018 5.5  3.6 - 11.0 K/uL Final    Comment: Due to mathematical rounding between the [address], and the new [employer] Hemat[employer] analyzers, the reported automated differential may vary by up to +/- 0.5% per cell line. This finding may produce a result that is 100% +/- 3%, which is clinically insignificant.       RBC 11/12/2018 4.30  3.80 - 5.20 M/uL Final    HGB 11/12/2018 11.7  11.5 - 16.0 g/dL Final    HCT 11/12/2018 37.4  35.0 - 47.0 % Final    MCV 11/12/2018 87.0  80.0 - 99.0 FL Final    MCH 11/12/2018 27.2  26.0 - 34.0 PG Final    MCHC 11/12/2018 31.3  30.0 - 36.5 g/dL Final    RDW 11/12/2018 23.0* 11.5 - 14.5 % Final    PLATELET 87/71/7696 501  150 - 400 K/uL Final    MPV 11/12/2018 11.7  8.9 - 12.9 FL Final    NRBC 11/12/2018 0.0  0  WBC Final    ABSOLUTE NRBC 11/12/2018 0.00  0.00 - 0.01 K/uL Final    NEUTROPHILS 11/12/2018 50  32 - 75 % Final    LYMPHOCYTES 11/12/2018 37  12 - 49 % Final    MONOCYTES 11/12/2018 8  5 - 13 % Final    EOSINOPHILS 11/12/2018 4  0 - 7 % Final    BASOPHILS 11/12/2018 1  0 - 1 % Final    IMMATURE GRANULOCYTES 11/12/2018 0  0.0 - 0.5 % Final    ABS. NEUTROPHILS 11/12/2018 2.8  1.8 - 8.0 K/UL Final    ABS. LYMPHOCYTES 11/12/2018 2.0  0.8 - 3.5 K/UL Final    ABS. MONOCYTES 11/12/2018 0.4  0.0 - 1.0 K/UL Final    ABS. EOSINOPHILS 11/12/2018 0.2  0.0 - 0.4 K/UL Final    ABS. BASOPHILS 11/12/2018 0.1  0.0 - 0.1 K/UL Final    ABS. IMM. GRANS. 11/12/2018 0.0  0.00 - 0.04 K/UL Final    DF 11/12/2018 SMEAR SCANNED    Final    RBC COMMENTS 11/12/2018     Final                    Value:ANISOCYTOSIS  1+      RBC COMMENTS 11/12/2018     Final                    Value:OVALOCYTES  PRESENT      SAMPLES BEING HELD 11/12/2018 1sst   Final    COMMENT 11/12/2018 Add-on orders for these samples will be processed based on acceptable specimen integrity and analyte stability, which may vary by analyte. Final         Objective:       Exam:  Visit Vitals  /80   Temp 99.8 °F (37.7 °C)   Wt 81.9 kg (180 lb 8 oz)   BMI 27.65 kg/m²     Gen: Awake, alert, follows commands  Appropriate appearance, normal speech. Oriented to all spheres. No visual field defect on confrontation exam.  Full eyes movement, with no nystagmus, no diplopia, no ptosis. Normal gag and swallow. All remaining cranial nerves were normal  Motor function: 5/5 in all extremities  Sensory: intact to LT, PP and JPS  Good FTN and HTS   Gait: Normal    Assessment:       ICD-10-CM ICD-9-CM    1. Fibromyalgia M79.7 729.1 REFERRAL TO PAIN MANAGEMENT      onabotulinumtoxinA (BOTOX) 200 unit injection   2. Intractable chronic migraine without aura and with status migrainosus G43.711 346.73 REFERRAL TO PAIN MANAGEMENT      onabotulinumtoxinA (BOTOX) 200 unit injection         Plan:   1.  Discussed with patient MRI brain is NORMAL with NO evidence of Chiari malformation  2. Will refer to pain management for possible trigger point/epidural of neck and lower back  3. Since patient continues to have chronic daily migraine and failed multiple meds (Topamax and Nortriptyline) will refer to Dr Angelique Valerio for botox injection  4. Continue Tramadol 50 mg prn every day and Robaxin 500 mg 1 1/2 tabs every day prn.  done - no info seen for [place]  5. Continue Gabapentin 300 mg 1 tab in AM and 2 tabs PM  5. Will refer for acupuncture c/o Dr Mirta Clemente  6. Given [address] [place] [address] rheumatologist to see to clarify Lupus and fibromyalgia diagnosis            Follow-up Disposition:  Return if symptoms worsen or fail to improve.           Kofi Moore MD  Diplomate, American Board of Psychiatry and Neurology  Diplomate, Neuromuscular Medicine  Diplomate, American Board of Electrodiagnostic Medicine

## 2018-12-10 NOTE — LETTER
12/10/2018 2:36 PM 
 
Patient:  Silvestre Ceron YOB: 1980 Date of Visit: 12/10/2018 Dear MD Hilario Nunez [address] [address] VIA In Basket 
 : Thank you for referring Ms. Silvestre Ceron to me for evaluation/treatment. Below are the relevant portions of my assessment and plan of care. If you have questions, please do not hesitate to call me. I look forward to following Ms. Kraft along with you. Sincerely, Dedrick Bailey MD

## 2018-12-10 NOTE — PATIENT INSTRUCTIONS
Migraine Headache: Care Instructions  Your Care Instructions  Migraines are painful, throbbing headaches that often start on one side of the head. They may cause nausea and vomiting and make you sensitive to light, sound, or smell. Without treatment, migraines can last from 4 hours to a few days. Medicines can help prevent migraines or stop them after they have started. Your doctor can help you find which ones work best for you. Follow-up care is a key part of your treatment and safety. Be sure to make and go to all appointments, and call your doctor if you are having problems. It's also a good idea to know your test results and keep a list of the medicines you take. How can you care for yourself at home? · Do not drive if you have taken a prescription pain medicine. · Rest in a quiet, dark room until your headache is gone. Close your eyes, and try to relax or go to sleep. Don't watch TV or read. · Put a cold, moist cloth or cold pack on the painful area for 10 to 20 minutes at a time. Put a thin cloth between the cold pack and your skin. · Use a warm, moist towel or a heating pad set on low to relax tight shoulder and neck muscles. · Have someone gently massage your neck and shoulders. · Take your medicines exactly as prescribed. Call your doctor if you think you are having a problem with your medicine. You will get more details on the specific medicines your doctor prescribes. · Be careful not to take pain medicine more often than the instructions allow. You could get worse or more frequent headaches when the medicine wears off. To prevent migraines  · Keep a headache diary so you can figure out what triggers your headaches. Avoiding triggers may help you prevent headaches. Record when each headache began, how long it lasted, and what the pain was like.  (Was it throbbing, aching, stabbing, or dull?) Write down any other symptoms you had with the headache, such as nausea, flashing lights or dark spots, or sensitivity to bright light or loud noise. Note if the headache occurred near your period. List anything that might have triggered the headache. Triggers may include certain foods (chocolate, cheese, wine) or odors, smoke, bright light, stress, or lack of sleep. · If your doctor has prescribed medicine for your migraines, take it as directed. You may have medicine that you take only when you get a migraine and medicine that you take all the time to help prevent migraines. ? If your doctor has prescribed medicine for when you get a headache, take it at the first sign of a migraine, unless your doctor has given you other instructions. ? If your doctor has prescribed medicine to prevent migraines, take it exactly as prescribed. Call your doctor if you think you are having a problem with your medicine. · Find healthy ways to deal with stress. Migraines are most common during or right after stressful times. Take time to relax before and after you do something that has caused a migraine in the past.  · Try to keep your muscles relaxed by keeping good posture. Check your jaw, face, neck, and shoulder muscles for tension. Try to relax them. When you sit at a desk, change positions often. And make sure to stretch for 30 seconds each hour. · Get plenty of sleep and exercise. · Eat meals on a regular schedule. Avoid foods and drinks that often trigger migraines. These include chocolate, alcohol (especially red wine and port), aspartame, monosodium glutamate (MSG), and some additives found in foods (such as hot dogs, nichole, cold cuts, aged cheeses, and pickled foods). · Limit caffeine. Don't drink too much coffee, tea, or soda. But don't quit caffeine suddenly. That can also give you migraines. · Do not smoke or allow others to smoke around you. If you need help quitting, talk to your doctor about stop-smoking programs and medicines. These can increase your chances of quitting for good.   · If you are taking birth control pills or hormone therapy, talk to your doctor about whether they are triggering your migraines. When should you call for help? Call 911 anytime you think you may need emergency care. For example, call if:    · You have signs of a stroke. These may include:  ? Sudden numbness, paralysis, or weakness in your face, arm, or leg, especially on only one side of your body. ? Sudden vision changes. ? Sudden trouble speaking. ? Sudden confusion or trouble understanding simple statements. ? Sudden problems with walking or balance. ? A sudden, severe headache that is different from past headaches.    Call your doctor now or seek immediate medical care if:    · You have new or worse nausea and vomiting.     · You have a new or higher fever.     · Your headache gets much worse.    Watch closely for changes in your health, and be sure to contact your doctor if:    · You are not getting better after 2 days (48 hours). Where can you learn more? Go to http://niraj-dara.info/. Enter J592 in the search box to learn more about \"Migraine Headache: Care Instructions. \"  Current as of: June 4, 2018  Content Version: 11.8  © 9069-9759 [employer]. Care instructions adapted under license by [employer] (which disclaims liability or warranty for this information). If you have questions about a medical condition or this instruction, always ask your healthcare professional. [address] any warranty or liability for your use of this information. Fibromyalgia: Care Instructions  Your Care Instructions    Fibromyalgia is a painful condition that is not completely understood by medical experts. The cause of fibromyalgia is not known. It can make you feel tired and ache all over. It causes tender spots at specific points of the body that hurt only when you press on them. You may have trouble sleeping, as well as other symptoms.  These problems can upset your work and home life. Symptoms tend to come and go, although they may never go away completely. Fibromyalgia does not harm your muscles, joints, or organs. Follow-up care is a key part of your treatment and safety. Be sure to make and go to all appointments, and call your doctor if you are having problems. It's also a good idea to know your test results and keep a list of the medicines you take. How can you care for yourself at home? · Exercise often. Walk, swim, or bike to help with pain and sleep problems and to make you feel better. · Try to get a good night's sleep. Go to bed and get up at the same time each day, whether you feel rested or not. Make sure you have a good mattress and pillow. · Reduce stress. Avoid things that cause you stress, if you can. If not, work at making them less stressful. Learn to use biofeedback, guided imagery, meditation, or other methods to relax. · Make healthy changes. Eat a balanced diet, quit smoking, and limit alcohol and caffeine. · Use a heating pad set on low or take warm baths or showers for pain. Using cold packs for up to 20 minutes at a time can also relieve pain. Put a thin cloth between the cold pack and your skin. A gentle massage might help too. · Be safe with medicines. Take your medicines exactly as prescribed. Call your doctor if you think you are having a problem with your medicine. Your doctor may talk to you about taking antidepressant medicines. These medicines may improve sleep, relieve pain, and in some cases treat depression. · Learn about fibromyalgia. This makes coping easier. Then, take an active role in your treatment. · Think about joining a support group with others who have fibromyalgia to learn more and get support. When should you call for help?   Watch closely for changes in your health, and be sure to contact your doctor if:    · You feel sad, helpless, or hopeless; lose interest in things you used to enjoy; or have other symptoms of depression.     · Your fibromyalgia symptoms get worse. Where can you learn more? Go to http://niraj-dara.info/. Enter V003 in the search box to learn more about \"Fibromyalgia: Care Instructions. \"  Current as of: June 4, 2018  Content Version: 11.8  © 8927-7723 [employer], [employer]. Care instructions adapted under license by [employer] (which disclaims liability or warranty for this information). If you have questions about a medical condition or this instruction, always ask your healthcare professional. [address] any warranty or liability for your use of this information.

## 2018-12-12 ENCOUNTER — TELEPHONE (OUTPATIENT)
Dept: NEUROLOGY | Age: 38
End: 2018-12-12

## 2018-12-12 NOTE — TELEPHONE ENCOUNTER
Received [facility] denial letter regarding Tramadol 50mg #0/0 days  Reason: patient do not meet requirements    PA case closed     Clinical staff informed

## 2018-12-13 NOTE — TELEPHONE ENCOUNTER
Called, spoke to pt. Pt was informed the trmAdol is denied. Pt verbalized understanding of information discussed w/ no further questions at this time.

## 2018-12-13 NOTE — TELEPHONE ENCOUNTER
----- Message from Romy Rosales sent at 12/13/2018  1:23 PM EST -----  Regarding: Dr. Karmen Atkins returning call to office. Please leave detailed v/m regarding what call is about. [phone].

## 2019-01-07 DIAGNOSIS — G62.9 PERIPHERAL POLYNEUROPATHY: ICD-10-CM

## 2019-01-07 DIAGNOSIS — M79.7 FIBROMYALGIA: ICD-10-CM

## 2019-01-07 RX ORDER — GABAPENTIN 300 MG/1
CAPSULE ORAL
Qty: 90 CAP | Refills: 0 | Status: SHIPPED | OUTPATIENT
Start: 2019-01-07 | End: 2019-04-23 | Stop reason: SDUPTHER

## 2019-01-07 RX ORDER — METHOCARBAMOL 500 MG/1
TABLET, FILM COATED ORAL
Qty: 45 TAB | Refills: 0 | Status: SHIPPED | OUTPATIENT
Start: 2019-01-07 | End: 2019-03-27 | Stop reason: SDUPTHER

## 2019-01-23 NOTE — TELEPHONE ENCOUNTER
----- Message from Smith Adams sent at 1/23/2019  2:03 PM EST -----  Regarding: dr. Alfrieda Councilman refill  Pt is requesting a refill on Indomethacin 50mg. Requesting a 90day supply. Pharmacy on file. Pt is out of medication.  Best contact [phone]

## 2019-01-28 RX ORDER — INDOMETHACIN 50 MG/1
50 CAPSULE ORAL 2 TIMES DAILY
Qty: 180 CAP | Refills: 0 | Status: SHIPPED | OUTPATIENT
Start: 2019-01-28 | End: 2019-05-28 | Stop reason: SDUPTHER

## 2019-03-03 ENCOUNTER — APPOINTMENT (OUTPATIENT)
Dept: GENERAL RADIOLOGY | Age: 39
End: 2019-03-03
Attending: STUDENT IN AN ORGANIZED HEALTH CARE EDUCATION/TRAINING PROGRAM
Payer: COMMERCIAL

## 2019-03-03 ENCOUNTER — HOSPITAL ENCOUNTER (EMERGENCY)
Age: 39
Discharge: HOME | End: 2019-03-03
Attending: STUDENT IN AN ORGANIZED HEALTH CARE EDUCATION/TRAINING PROGRAM
Payer: COMMERCIAL

## 2019-03-03 VITALS
TEMPERATURE: 98.8 F | OXYGEN SATURATION: 99 % | HEIGHT: 68 IN | WEIGHT: 185 LBS | HEART RATE: 75 BPM | RESPIRATION RATE: 20 BRPM | SYSTOLIC BLOOD PRESSURE: 143 MMHG | DIASTOLIC BLOOD PRESSURE: 83 MMHG | BODY MASS INDEX: 28.04 KG/M2

## 2019-03-03 DIAGNOSIS — M54.5 LOW BACK PAIN, UNSPECIFIED BACK PAIN LATERALITY, UNSPECIFIED CHRONICITY, WITH SCIATICA PRESENCE UNSPECIFIED: Primary | ICD-10-CM

## 2019-03-03 PROCEDURE — 96372 THER/PROPH/DIAG INJ SC/IM: CPT

## 2019-03-03 PROCEDURE — 74011250636 HC RX REV CODE- 250/636: Performed by: STUDENT IN AN ORGANIZED HEALTH CARE EDUCATION/TRAINING PROGRAM

## 2019-03-03 PROCEDURE — 72100 X-RAY EXAM L-S SPINE 2/3 VWS: CPT

## 2019-03-03 PROCEDURE — 99282 EMERGENCY DEPT VISIT SF MDM: CPT

## 2019-03-03 RX ORDER — PREDNISONE 10 MG/1
TABLET ORAL
Qty: 21 TAB | Refills: 0 | Status: SHIPPED | OUTPATIENT
Start: 2019-03-03 | End: 2019-05-01 | Stop reason: SDUPTHER

## 2019-03-03 RX ORDER — KETOROLAC TROMETHAMINE 30 MG/ML
30 INJECTION, SOLUTION INTRAMUSCULAR; INTRAVENOUS ONCE
Status: COMPLETED | OUTPATIENT
Start: 2019-03-03 | End: 2019-03-03

## 2019-03-03 RX ADMIN — KETOROLAC TROMETHAMINE 30 MG: 30 INJECTION, SOLUTION INTRAMUSCULAR; INTRAVENOUS at 18:09

## 2019-03-03 NOTE — ED PROVIDER NOTES
37 y.o. female with past medical history significant for anemia, cervical incompetence, chiari malformation type I, fibromyalgia, headache, lupus, osteoarthritis, peripheral neuropathy, Raynaud phenomenon, and other torticollis who presents from home with chief complaint of back pain. Patient states that she began experiencing lower back pain after work 3 days ago. She states that her back pain has continued to progressively worsen over the past 3 days. Patient describes her back discomfort as an \"achiness and soreness. \"  She adds that her back pain is \"sharp, stabbing, and pinpoint with a surrounding pressure. \"  She also complains of associated back swelling. Patient states that she has taken Ultram, methocarbamol and tramadol for her symptoms with no significant relief. Of note, patient states that she has been diagnosed with an \"intracanal protrusion of the disc previously. \"  Patient states that she takes indomethacin and gabapentin nightly. She admits to having a scar on her lower back and states that it was from \"hitting a metal pipe as a  when she was a teenager. \"  Patient admits that she had a sore throat last week on Monday, Tuesday, and Wednesday. 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 Patient mentions that she has had back pain previously but notes that \"it was different. \"  She states that she has been seen by a chiropractor previously for \"thoracic pain. \"  Patient denies leg pain, dysuria, hematuria, fever, and chills. There are no other acute medical concerns at this time. Social hx: negative tobacco use; occasional alcohol use; negative drug use PCP: Akiko Alvarez MD 
 
Note written by Natty Bo, as dictated by Beryle Ego, MD 5:43 PM 
 
 
 
The history is provided by the patient. No  was used. Past Medical History:  
Diagnosis Date  Anemia  Cervical incompetence s/p cerclage with last pregnancy  [facility]  Fibromyalgia  Headache  History of  delivery   
 due to cervical incompetency-36 wks, 32 wks, and 37 wks deliveries  Lupus 2003  Lupus  Osteoarthritis  Other torticollis  Peripheral neuropathy  Raynaud phenomenon Past Surgical History:  
Procedure Laterality Date  HX GASTRIC BYPASS  2007  HX ORTHOPAEDIC Left finger surgery  HX TONSILLECTOMY  2004 Family History:  
Problem Relation Age of Onset  Cancer Mother   
     rectal  
 Coronary Artery Disease Father  Parkinson's Disease Maternal Grandmother Social History Socioeconomic History  Marital status:  Spouse name: Not on file  Number of children: Not on file  Years of education: Not on file  Highest education level: Not on file Social Needs  Financial resource strain: Not on file  Food insecurity - worry: Not on file  Food insecurity - inability: Not on file  Transportation needs - medical: Not on file  Transportation needs - non-medical: Not on file Occupational History  Not on file Tobacco Use  Smoking status: Never Smoker  Smokeless tobacco: Never Used Substance and Sexual Activity  Alcohol use: Yes Comment: Per patient, \"varies 1 - 2 per month\"  Drug use: No  
 Sexual activity: Yes  
  Partners: Male Birth control/protection: None Other Topics Concern  Not on file Social History Narrative  Not on file ALLERGIES: Pcn [penicillins]; Codeine; and Doxycycline Review of Systems Constitutional: Negative for chills and fever. HENT: Negative for sore throat. Respiratory: Negative for cough and shortness of breath. Cardiovascular: Negative for chest pain. Gastrointestinal: Negative for abdominal pain and vomiting. Genitourinary: Negative for dysuria. Musculoskeletal: Positive for back pain. Skin: Negative for rash. Neurological: Negative for syncope and headaches. Psychiatric/Behavioral: Negative for confusion. All other systems reviewed and are negative. Vitals:  
 03/03/19 1746 BP: 143/83 Pulse: 75 Resp: 20 Temp: 98.8 °F (37.1 °C) SpO2: 99% Weight: 83.9 kg (185 lb) Height: 5' 8\" (1.727 m) Physical Exam  
Constitutional: She is oriented to person, place, and time. She appears well-developed. No distress. HENT:  
Head: Normocephalic and atraumatic. Eyes: Conjunctivae and EOM are normal.  
Neck: Normal range of motion. Neck supple. Cardiovascular: Normal rate, regular rhythm and normal heart sounds. Pulmonary/Chest: Effort normal and breath sounds normal. No respiratory distress. Abdominal: Soft. There is no tenderness. There is no guarding. Musculoskeletal: Normal range of motion. She exhibits no edema. Bilateral paraspinal muscular tenderness to palpation over lumbar region Neurological: She is alert and oriented to person, place, and time. She exhibits normal muscle tone. Skin: Skin is warm and dry. Note written by Natty Oglesby, as dictated by Arianna Mcwilliams MD 5:43 PM 
 
 
MDM Procedures The patient presented with acute back pain. The patient is now resting comfortably and feels better, is alert, talkative, interactive and in no distress. The repeat examination is unremarkable and benign. The patient is neurologically intact and is ambulatory in the ED. The patient has no fever, no bowel or bladder incontinence, no saddle anesthesia, and is otherwise alert and well-appearing. The history, physical examination, and diagnostics (if any) do not suggest the presence of acute spinal epidural abscess, acute spinal epidural bleed, cauda equina syndrome, abdominal aortic aneurysm, aortic dissection or other process requiring further testing, treatment or consultation in the emergency department.  The vital signs have been stable. The patient's condition is stable and appropriate for discharge. The patient will pursue further outpatient evaluation with the primary care physician or other designated or consulting physician as indicated in the discharge instructions.

## 2019-03-03 NOTE — DISCHARGE INSTRUCTIONS
Patient Education        Learning About Relief for Back Pain  What is back tension and strain? Back strain happens when you overstretch, or pull, a muscle in your back. You may hurt your back in an accident or when you exercise or lift something. Most back pain will get better with rest and time. You can take care of yourself at home to help your back heal.  What can you do first to relieve back pain? When you first feel back pain, try these steps:  · Walk. Take a short walk (10 to 20 minutes) on a level surface (no slopes, hills, or stairs) every 2 to 3 hours. Walk only distances you can manage without pain, especially leg pain. · Relax. Find a comfortable position for rest. Some people are comfortable on the floor or a medium-firm bed with a small pillow under their head and another under their knees. Some people prefer to lie on their side with a pillow between their knees. Don't stay in one position for too long. · Try heat or ice. Try using a heating pad on a low or medium setting, or take a warm shower, for 15 to 20 minutes every 2 to 3 hours. Or you can buy single-use heat wraps that last up to 8 hours. You can also try an ice pack for 10 to 15 minutes every 2 to 3 hours. You can use an ice pack or a bag of frozen vegetables wrapped in a thin towel. There is not strong evidence that either heat or ice will help, but you can try them to see if they help. You may also want to try switching between heat and cold. · Take pain medicine exactly as directed. ¨ If the doctor gave you a prescription medicine for pain, take it as prescribed. ¨ If you are not taking a prescription pain medicine, ask your doctor if you can take an over-the-counter medicine. What else can you do? · Stretch and exercise. Exercises that increase flexibility may relieve your pain and make it easier for your muscles to keep your spine in a good, neutral position. And don't forget to keep walking. · Do self-massage.  You can use self-massage to unwind after work or school or to energize yourself in the morning. You can easily massage your feet, hands, or neck. Self-massage works best if you are in comfortable clothes and are sitting or lying in a comfortable position. Use oil or lotion to massage bare skin. · Reduce stress. Back pain can lead to a vicious [place]: Distress about the pain tenses the muscles in your back, which in turn causes more pain. Learn how to relax your mind and your muscles to lower your stress. Where can you learn more? Go to http://niraj-dara.info/. Enter R106 in the search box to learn more about \"Learning About Relief for Back Pain. \"  Current as of: March 21, 2017  Content Version: 11.5  © 4409-2874 [employer]. Care instructions adapted under license by [employer] (which disclaims liability or warranty for this information). If you have questions about a medical condition or this instruction, always ask your healthcare professional. [address] any warranty or liability for your use of this information.

## 2019-03-27 DIAGNOSIS — M79.7 FIBROMYALGIA: ICD-10-CM

## 2019-03-27 RX ORDER — METHOCARBAMOL 500 MG/1
TABLET, FILM COATED ORAL
Qty: 45 TAB | Refills: 0 | Status: SHIPPED | OUTPATIENT
Start: 2019-03-27 | End: 2019-04-23 | Stop reason: SDUPTHER

## 2019-04-23 DIAGNOSIS — G62.9 PERIPHERAL POLYNEUROPATHY: ICD-10-CM

## 2019-04-23 DIAGNOSIS — M79.7 FIBROMYALGIA: ICD-10-CM

## 2019-04-23 RX ORDER — GABAPENTIN 300 MG/1
CAPSULE ORAL
Qty: 90 CAP | Refills: 0 | Status: SHIPPED | OUTPATIENT
Start: 2019-04-23 | End: 2019-05-22 | Stop reason: SDUPTHER

## 2019-04-23 RX ORDER — METHOCARBAMOL 500 MG/1
TABLET, FILM COATED ORAL
Qty: 45 TAB | Refills: 0 | Status: SHIPPED | OUTPATIENT
Start: 2019-04-23 | End: 2019-06-24 | Stop reason: SDUPTHER

## 2019-05-01 ENCOUNTER — OFFICE VISIT (OUTPATIENT)
Dept: FAMILY MEDICINE CLINIC | Age: 39
End: 2019-05-01

## 2019-05-01 VITALS
OXYGEN SATURATION: 99 % | WEIGHT: 193 LBS | DIASTOLIC BLOOD PRESSURE: 72 MMHG | TEMPERATURE: 98.4 F | BODY MASS INDEX: 29.25 KG/M2 | RESPIRATION RATE: 18 BRPM | HEIGHT: 68 IN | SYSTOLIC BLOOD PRESSURE: 109 MMHG | HEART RATE: 68 BPM

## 2019-05-01 DIAGNOSIS — M62.830 LUMBAR PARASPINAL MUSCLE SPASM: Primary | ICD-10-CM

## 2019-05-01 RX ORDER — KETOROLAC TROMETHAMINE 30 MG/ML
30 INJECTION, SOLUTION INTRAMUSCULAR; INTRAVENOUS ONCE
Qty: 1 VIAL | Refills: 0
Start: 2019-05-01 | End: 2019-05-01

## 2019-05-01 RX ORDER — KETOROLAC TROMETHAMINE 30 MG/ML
30 INJECTION, SOLUTION INTRAMUSCULAR; INTRAVENOUS ONCE
Qty: 1 VIAL | Refills: 0
Start: 2019-05-01 | End: 2019-05-01 | Stop reason: SDUPTHER

## 2019-05-01 RX ORDER — PREDNISONE 10 MG/1
TABLET ORAL
Qty: 21 TAB | Refills: 0 | Status: SHIPPED | OUTPATIENT
Start: 2019-05-01

## 2019-05-01 NOTE — PROGRESS NOTES
Chief Complaint   Patient presents with    Back Pain     cant stand up straight since yesterday, midback pain

## 2019-05-01 NOTE — PROGRESS NOTES
Subjective:   History: This patient is a 37 y.o. female with a chief complaint of back pain. 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 Achyness in the back that started up again yesterday. Has had back problems ongoing for the last few months, which she relates to likely being from lifting heavy patients at work. Does not remember one event that caused a lot of her pain, but rather notes that it has gradually come on with repetitive wear and tear. She feels like the pain is pulling and spasming in the lower back, worse on the left side but present bilaterally. When she had this previously, she improved significantly with a steroid pack and     No bowel or bladder incontinence. No fever, night sweats, or weight changes. No saddle anesthesia. Review of Systems:  General/Constitutional:  No fever, chills, sweats, fatigue, night sweats, weakness, weight loss or weight gain   Head: No headache, no trauma   Neck: No swelling, masses, stiffness, pain, or limited movement   Cardiac: No chest pain   Respiratory: No cough, shortness of breath, or dyspnea on exertion   GI: No incontinence. No nausea/vomiting, diarrhea, abdominal pain, bloody or dark stools  : No incontinence. No change in urinary habits. Peripheral Vascular: No edema, coldness, numbness, discoloration, pain, or paresthesias   Musculoskeletal: As per HPI  Neurological: No saddle distribution paresthesia. No siatic pain. No loss of consciousness, dizziness, seizures, dysarthria, cognitive changes, problems with balance, or unilateral weakness.     Past Medical History:   Diagnosis Date    Anemia     Cervical incompetence     s/p cerclage with last pregnancy    Chiari malformation type I ([address] 2012    Fibromyalgia     Headache     History of  delivery     due to cervical incompetency-36 wks, 32 wks, and 37 wks deliveries    Lupus ([address] 2003    Lupus ([address]     Osteoarthritis     Other torticollis     Peripheral neuropathy     Raynaud phenomenon      Family History   Problem Relation Age of Onset    Cancer Mother         rectal    Coronary Artery Disease Father     Parkinson's Disease Maternal Grandmother      Current Outpatient Medications   Medication Sig Dispense Refill    gabapentin (NEURONTIN) 300 mg capsule TAKE 1 CAPSULE BY MOUTH EVERY MORNING AND 2 CAPSULES EVERY EVENING 90 Cap 0    methocarbamol (ROBAXIN) 500 mg tablet TAKE 1 AND 1/2 TABLETS BY MOUTH EVERY DAY AS NEEDED FOR SEVERE PAIN 45 Tab 0    indomethacin (INDOCIN) 50 mg capsule Take 1 Cap by mouth two (2) times a day. 180 Cap 0    pramipexole (MIRAPEX) 0.125 mg tablet TAKE 1 TABLET BY MOUTH BEFORE BEDTIME 90 Tab 0    traMADol (ULTRAM) 50 mg tablet Take 1 Tab by mouth daily. Max Daily Amount: 50 mg. Take 1 tab prn for severe pain 30 Tab 0    ascorbate calcium (VITAMIN C PO) Take 60 mg by mouth two (2) times a day.  prenatal vits w-Ca,Fe,FA,1 mg, (PRENATAL 1 + IRON PO) Take 1 Tab by mouth daily.  clobetasol (TEMOVATE) 0.05 % topical cream ELICEO EXT AA QD PRN  1    cholecalciferol (VITAMIN D3) 50,000 unit capsule Take  by mouth every seven (7) days.  ferrous fumarate 324 mg (106 mg iron) tab Take  by mouth.  predniSONE (STERAPRED DS) 10 mg dose pack Take as directed.  21 Tab 0    onabotulinumtoxinA (BOTOX) 200 unit injection As directed:  FDA approved sites, every 3 months 200 units  Dx G43.711 200 Units 3     Allergies   Allergen Reactions    Pcn [Penicillins] Anaphylaxis    Codeine Nausea and Vomiting     Patient states it causes intractable vomiting     Doxycycline Rash     Social History     Socioeconomic History    Marital status:      Spouse name: Not on file    Number of children: Not on file    Years of education: Not on file    Highest education level: Not on file   Occupational History    Not on file   Social Needs    Financial resource strain: Not on file    Food insecurity: Worry: Not on file     Inability: Not on file    Transportation needs:     Medical: Not on file     Non-medical: Not on file   Tobacco Use    Smoking status: Never Smoker    Smokeless tobacco: Never Used   Substance and Sexual Activity    Alcohol use: Yes     Comment: Per patient, \"varies 1 - 2 per month\"    Drug use: No    Sexual activity: Yes     Partners: Male     Birth control/protection: None   Lifestyle    Physical activity:     Days per week: Not on file     Minutes per session: Not on file    Stress: Not on file   Relationships    Social connections:     Talks on phone: Not on file     Gets together: Not on file     Attends [employer] service: Not on file     Active member of club or organization: Not on file     Attends meetings of clubs or organizations: Not on file     Relationship status: Not on file    Intimate partner violence:     Fear of current or ex partner: Not on file     Emotionally abused: Not on file     Physically abused: Not on file     Forced sexual activity: Not on file   Other Topics Concern    Not on file   Social History Narrative    Not on file       Objective:     Visit Vitals  /72   Pulse 68   Temp 98.4 °F (36.9 °C) (Oral)   Resp 18   Ht 5' 8\" (1.727 m)   Wt 193 lb (87.5 kg)   LMP 04/13/2019   SpO2 99%   BMI 29.35 kg/m²       General: Alert and oriented and in no acute distress. Responds to all questions appropriately. LUNGS: Respirations unlabored. Skin: No obvious rash.   MSK:    Posture: Normal   Deformity: None    ROM:     Flexion: Normal    Extension: Normal     Lateral bending: Normal      Gait: Normal       Palpation:    L1-L5: No tenderness    Sacrum: No tenderness    Coccyx: No tenderness    Left Paraspinal: Moderate tenderness    Right Paraspinal: Moderate tenderness     Strength (0-5/5)    Hip Flexion:   Left: 5/5  Right: 5/5    Hip Extension:  Left: 5/5  Right: 5/5    Hip Abduction:  Left: 5/5  Right: 5/5    Hip Adduction:  Left: 5/5  Right: 5/5    Knee Extension:  Left: 5/5  Right: 5/5    Knee Flexion:   Left: 5/5  Right: 5/5    Ankle dorsiflexion:  Left: 5/5  Right: 5/5    Ankle plantarflexion:  Left: 5/5  Right: 5/5    Great toe extension:  Left: 5/5  Right: 5/5     Sensation: Intact, no deficits      DTR:    Patella:  Left: +2  Right: +2    Achilles:  Left: +2  Right: +2     Special test:    Straight leg: Left: Negative  Right: Negative    Claribels: Left: Negative  Right: Negative                 Assessment:   1. Lumbar paraspinal muscle spasm  Patient with acute on chronic back pain, but appears to be muscular on exam, with pinpoint areas of tenderness. Discussed trigger point injections, which patient would prefer to avoid at this time. Patient improved last time with prednisone and ketorlac; will trial this again. Patient agreeable to attempting PT to help with pain and work on strengthening as well. Patient otherwise educated on general time frame of improvement, and will let us know if not improving.   - predniSONE (STERAPRED DS) 10 mg dose pack; Take as directed. Dispense: 21 Tab; Refill: 0  - KETOROLAC TROMETHAMINE INJ  - NC THER/PROPH/DIAG INJECTION, SUBCUT/IM  - REFERRAL TO PHYSICAL THERAPY      Plan:   1. PT referral  2. Ice 15 minutes, three times a day PRN and after exercise. Can alternate with heat for 15 minutes. Medications:    1. Naproxin (Aleve): 220mg 1-2 tablets twice a day PRN. 2. Acetaminophen (Tylenol):  500mg 1-2 tablets every 6 hours as needed for pain.       RTC: PRN

## 2019-05-01 NOTE — LETTER
NOTIFICATION RETURN TO WORK / SCHOOL 
 
5/1/2019 3:36 PM 
 
Ms. Elaine Loja 
[address] [place] [phone] To Whom It May Concern: 
 
Elaine Loja is currently under the care of [facility]. She will return to work on: 5/6/19 without restrictions If there are questions or concerns please have the patient contact our office.  
 
 
 
Sincerely, 
 
 
Chantel Duvall MD

## 2019-05-22 DIAGNOSIS — G62.9 PERIPHERAL POLYNEUROPATHY: ICD-10-CM

## 2019-05-22 RX ORDER — GABAPENTIN 300 MG/1
CAPSULE ORAL
Qty: 90 CAP | Refills: 0 | Status: SHIPPED | OUTPATIENT
Start: 2019-05-22 | End: 2019-07-30 | Stop reason: SDUPTHER

## 2019-05-28 RX ORDER — INDOMETHACIN 50 MG/1
CAPSULE ORAL
Qty: 180 CAP | Refills: 0 | Status: SHIPPED | OUTPATIENT
Start: 2019-05-28

## 2019-06-22 DIAGNOSIS — G25.81 RLS (RESTLESS LEGS SYNDROME): ICD-10-CM

## 2019-06-24 DIAGNOSIS — M79.7 FIBROMYALGIA: ICD-10-CM

## 2019-06-24 RX ORDER — PRAMIPEXOLE DIHYDROCHLORIDE 0.12 MG/1
TABLET ORAL
Qty: 90 TAB | Refills: 0 | Status: SHIPPED | OUTPATIENT
Start: 2019-06-24

## 2019-06-24 RX ORDER — METHOCARBAMOL 500 MG/1
TABLET, FILM COATED ORAL
Qty: 45 TAB | Refills: 0 | Status: SHIPPED | OUTPATIENT
Start: 2019-06-24

## 2021-02-26 ENCOUNTER — APPOINTMENT (RX ONLY)
Dept: URBAN - METROPOLITAN AREA CLINIC 148 | Facility: CLINIC | Age: 41
Setting detail: DERMATOLOGY
End: 2021-02-26

## 2021-02-26 VITALS — TEMPERATURE: 98.3 F

## 2021-02-26 DIAGNOSIS — L82.1 OTHER SEBORRHEIC KERATOSIS: ICD-10-CM

## 2021-02-26 DIAGNOSIS — L30.9 DERMATITIS, UNSPECIFIED: ICD-10-CM

## 2021-02-26 PROCEDURE — ? ADDITIONAL NOTES

## 2021-02-26 PROCEDURE — ? COUNSELING

## 2021-02-26 PROCEDURE — 99204 OFFICE O/P NEW MOD 45 MIN: CPT

## 2021-02-26 PROCEDURE — ? PRESCRIPTION

## 2021-02-26 RX ORDER — CLOBETASOL PROPIONATE 0.5 MG/G
CREAM TOPICAL
Qty: 1 | Refills: 0 | Status: ERX | COMMUNITY
Start: 2021-02-26

## 2021-02-26 RX ADMIN — CLOBETASOL PROPIONATE: 0.5 CREAM TOPICAL at 00:00

## 2021-02-26 ASSESSMENT — LOCATION SIMPLE DESCRIPTION DERM
LOCATION SIMPLE: LEFT ANKLE
LOCATION SIMPLE: RIGHT CHEEK

## 2021-02-26 ASSESSMENT — LOCATION ZONE DERM
LOCATION ZONE: LEG
LOCATION ZONE: FACE

## 2021-02-26 ASSESSMENT — LOCATION DETAILED DESCRIPTION DERM
LOCATION DETAILED: RIGHT CENTRAL MALAR CHEEK
LOCATION DETAILED: LEFT POSTERIOR ANKLE

## 2021-07-21 ENCOUNTER — HOSPITAL ENCOUNTER (OUTPATIENT)
Dept: GENERAL RADIOLOGY | Facility: HOSPITAL | Age: 41
Discharge: HOME | End: 2021-07-21

## 2021-07-21 ENCOUNTER — TRANSCRIBE ORDERS (OUTPATIENT)
Dept: ADMINISTRATIVE | Facility: HOSPITAL | Age: 41
End: 2021-07-21

## 2021-07-21 DIAGNOSIS — G89.29 CHRONIC NECK PAIN: Primary | ICD-10-CM

## 2021-07-21 DIAGNOSIS — M54.2 CHRONIC NECK PAIN: Primary | ICD-10-CM

## 2022-01-07 ENCOUNTER — TRANSCRIBE ORDERS (OUTPATIENT)
Dept: ADMINISTRATIVE | Facility: HOSPITAL | Age: 42
End: 2022-01-07

## 2022-01-07 DIAGNOSIS — M54.2 CHRONIC NECK PAIN: Primary | ICD-10-CM

## 2022-01-07 DIAGNOSIS — G89.29 CHRONIC NECK PAIN: Primary | ICD-10-CM
